# Patient Record
Sex: FEMALE | Race: WHITE | NOT HISPANIC OR LATINO | URBAN - METROPOLITAN AREA
[De-identification: names, ages, dates, MRNs, and addresses within clinical notes are randomized per-mention and may not be internally consistent; named-entity substitution may affect disease eponyms.]

---

## 2017-01-31 ENCOUNTER — OUTPATIENT (OUTPATIENT)
Dept: OUTPATIENT SERVICES | Facility: HOSPITAL | Age: 62
LOS: 1 days | End: 2017-01-31

## 2018-01-16 ENCOUNTER — OUTPATIENT (OUTPATIENT)
Dept: OUTPATIENT SERVICES | Facility: HOSPITAL | Age: 63
LOS: 1 days | End: 2018-01-16

## 2018-08-17 ENCOUNTER — OUTPATIENT (OUTPATIENT)
Dept: OUTPATIENT SERVICES | Facility: HOSPITAL | Age: 63
LOS: 1 days | End: 2018-08-17

## 2019-01-22 ENCOUNTER — APPOINTMENT (OUTPATIENT)
Dept: FAMILY MEDICINE | Facility: CLINIC | Age: 64
End: 2019-01-22

## 2019-02-15 ENCOUNTER — APPOINTMENT (OUTPATIENT)
Dept: FAMILY MEDICINE | Facility: CLINIC | Age: 64
End: 2019-02-15

## 2019-02-19 ENCOUNTER — APPOINTMENT (OUTPATIENT)
Dept: FAMILY MEDICINE | Facility: CLINIC | Age: 64
End: 2019-02-19
Payer: MEDICARE

## 2019-02-19 ENCOUNTER — NON-APPOINTMENT (OUTPATIENT)
Age: 64
End: 2019-02-19

## 2019-02-19 VITALS
DIASTOLIC BLOOD PRESSURE: 98 MMHG | HEART RATE: 75 BPM | RESPIRATION RATE: 14 BRPM | SYSTOLIC BLOOD PRESSURE: 160 MMHG | BODY MASS INDEX: 27.68 KG/M2 | WEIGHT: 141 LBS | OXYGEN SATURATION: 98 % | TEMPERATURE: 97.6 F | HEIGHT: 60 IN

## 2019-02-19 VITALS — DIASTOLIC BLOOD PRESSURE: 84 MMHG | HEART RATE: 72 BPM | SYSTOLIC BLOOD PRESSURE: 152 MMHG

## 2019-02-19 DIAGNOSIS — G47.00 INSOMNIA, UNSPECIFIED: ICD-10-CM

## 2019-02-19 DIAGNOSIS — Z78.9 OTHER SPECIFIED HEALTH STATUS: ICD-10-CM

## 2019-02-19 DIAGNOSIS — Z82.49 FAMILY HISTORY OF ISCHEMIC HEART DISEASE AND OTHER DISEASES OF THE CIRCULATORY SYSTEM: ICD-10-CM

## 2019-02-19 DIAGNOSIS — Z87.891 PERSONAL HISTORY OF NICOTINE DEPENDENCE: ICD-10-CM

## 2019-02-19 PROCEDURE — 86580 TB INTRADERMAL TEST: CPT

## 2019-02-19 PROCEDURE — 93000 ELECTROCARDIOGRAM COMPLETE: CPT

## 2019-02-19 PROCEDURE — G0439: CPT

## 2019-02-19 NOTE — PHYSICAL EXAM
[No Acute Distress] : no acute distress [Normal Sclera/Conjunctiva] : normal sclera/conjunctiva [PERRL] : pupils equal round and reactive to light [Normal TMs] : both tympanic membranes were normal [Supple] : supple [Thyroid Normal, No Nodules] : the thyroid was normal and there were no nodules present [No Respiratory Distress] : no respiratory distress  [Clear to Auscultation] : lungs were clear to auscultation bilaterally [Normal Rate] : normal rate  [Regular Rhythm] : with a regular rhythm [Pedal Pulses Present] : the pedal pulses are present [No Edema] : there was no peripheral edema [Soft] : abdomen soft [Non Tender] : non-tender [No Masses] : no abdominal mass palpated [No CVA Tenderness] : no CVA  tenderness [No Spinal Tenderness] : no spinal tenderness [No Joint Swelling] : no joint swelling [No Rash] : no rash [Normal Gait] : normal gait [No Focal Deficits] : no focal deficits [Speech Grossly Normal] : speech grossly normal [Alert and Oriented x3] : oriented to person, place, and time [de-identified] : a bit tearful  however recovers  [de-identified] : OMM   NO TEETH  [de-identified] : warm and dry

## 2019-02-19 NOTE — HEALTH RISK ASSESSMENT
[No falls in past year] : Patient reported no falls in the past year [1] : 1) Little interest or pleasure doing things for several days (1) [0] : 2) Feeling down, depressed, or hopeless: Not at all (0) [Good] : ~his/her~ current health as good [Fair] :  ~his/her~ mood as fair [Change in mental status noted] : Change in mental status noted [None] : None [Alone] : lives alone [Unemployed] : unemployed [High School] : high school [] :  [# Of Children ___] : has [unfilled] children [Feels Safe at Home] : Feels safe at home [Fully functional (bathing, dressing, toileting, transferring, walking, feeding)] : Fully functional (bathing, dressing, toileting, transferring, walking, feeding) [Fully functional (using the telephone, shopping, preparing meals, housekeeping, doing laundry, using] : Fully functional and needs no help or supervision to perform IADLs (using the telephone, shopping, preparing meals, housekeeping, doing laundry, using transportation, managing medications and managing finances) [Reports changes in dental health] : Reports changes in dental health [Smoke Detector] : smoke detector [Seat Belt] :  uses seat belt [] : No [de-identified] : DENIES  [Guns at Home] : no guns at home [Travel to Developing Areas] : does not  travel to developing areas [TB Exposure] : is not being exposed to tuberculosis [MammogramDate] : 2018  [MammogramComments] : Has FU with surgeon today  [ColonoscopyDate] : 2012  [ColonoscopyComments] : Dr. KIDD  2/26/19 appt.  [de-identified] : grieving loss of mother  [FreeTextEntry2] : HHA "sometimes" for [employer]  [de-identified] : needs dentures

## 2019-02-19 NOTE — COUNSELING
[Healthy eating counseling provided] : healthy eating [Behavioral health counseling provided] : behavioral health  [Sleep ___ hours/day] : Sleep [unfilled] hours/day [Engage in a relaxing activity] : Engage in a relaxing activity [de-identified] : Strategies reviewed for sleep hygiene and regular sleep habits.   Was not taking Trazodone . Relies on Xanax "to calm me down".  \par Normal grieving at this time. \par \par Advised Mediterranean diet

## 2019-02-19 NOTE — ASSESSMENT
[FreeTextEntry1] :  Well exam for 63  year old WF with PMH as stated in HPI / active list. \par \par Management : \par \par See HPI and Plan\par \par Labs in office today.   Will advise. \par \par Best wishes offered !\par

## 2019-02-19 NOTE — REVIEW OF SYSTEMS
[Insomnia] : insomnia [Anxiety] : anxiety [Negative] : Gastrointestinal [Fever] : no fever [Chills] : no chills [Night Sweats] : no night sweats [Discharge] : no discharge [Vision Problems] : no vision problems [Itching] : no itching [Hearing Loss] : no hearing loss [Nasal Discharge] : no nasal discharge [Sore Throat] : no sore throat [Chest Pain] : no chest pain [Palpitations] : no palpitations [Headache] : no headache [Dizziness] : no dizziness [Confusion] : no confusion [FreeTextEntry9] : no complaints today  [de-identified] : hx of vertigo

## 2019-02-19 NOTE — HISTORY OF PRESENT ILLNESS
[FreeTextEntry1] : Pt is here for a physical\par [facility]  [de-identified] : Ms. TAMARA ARAMBULA presents today to establish care being referred to me by self. Known to me from [address] \par Her mother, Jacy Stinson, recently passed. \par \par She is an affable 63 year old female with PMH significant for cervical and L-S PAIN \par \par DCIS ( 2016) Follows with MD's in [place], Dr. Jimenez; Follows with Oncology \par \par PSH significant for  breast biopsy RIGHT \par \par Denies any recent ER visits/hospitalizations/ MVA's or MSK injuries. \par \par Social:  ; no children;  has siblings she is connected with \par               works intermittently for  [employer] as [employer] \par \par

## 2019-02-22 ENCOUNTER — RESULT CHARGE (OUTPATIENT)
Age: 64
End: 2019-02-22

## 2019-02-22 ENCOUNTER — APPOINTMENT (OUTPATIENT)
Dept: FAMILY MEDICINE | Facility: CLINIC | Age: 64
End: 2019-02-22

## 2019-02-22 DIAGNOSIS — R31.29 OTHER MICROSCOPIC HEMATURIA: ICD-10-CM

## 2019-02-25 LAB — URINE CYTOLOGY: NORMAL

## 2019-03-05 LAB
ALBUMIN SERPL ELPH-MCNC: 4.6 G/DL
ALP BLD-CCNC: 83 U/L
ALT SERPL-CCNC: 13 U/L
ANION GAP SERPL CALC-SCNC: 14 MMOL/L
AST SERPL-CCNC: 18 U/L
BASOPHILS # BLD AUTO: 0.03 K/UL
BASOPHILS NFR BLD AUTO: 0.5 %
BILIRUB SERPL-MCNC: 0.5 MG/DL
BILIRUB UR QL STRIP: NORMAL
BUN SERPL-MCNC: 23 MG/DL
CALCIUM SERPL-MCNC: 10 MG/DL
CHLORIDE SERPL-SCNC: 105 MMOL/L
CHOLEST SERPL-MCNC: 288 MG/DL
CHOLEST/HDLC SERPL: 4.2 RATIO
CLARITY UR: CLEAR
CO2 SERPL-SCNC: 24 MMOL/L
COLLECTION METHOD: NORMAL
CREAT SERPL-MCNC: 0.78 MG/DL
EOSINOPHIL # BLD AUTO: 0.12 K/UL
EOSINOPHIL NFR BLD AUTO: 1.9 %
GLUCOSE SERPL-MCNC: 111 MG/DL
GLUCOSE UR-MCNC: NORMAL
HCG UR QL: 0.2 EU/DL
HCT VFR BLD CALC: 37.8 %
HDLC SERPL-MCNC: 68 MG/DL
HGB BLD-MCNC: 12.5 G/DL
HGB UR QL STRIP.AUTO: NORMAL
IMM GRANULOCYTES NFR BLD AUTO: 0.2 %
KETONES UR-MCNC: NORMAL
LDLC SERPL CALC-MCNC: 191 MG/DL
LEUKOCYTE ESTERASE UR QL STRIP: NORMAL
LYMPHOCYTES # BLD AUTO: 2.32 K/UL
LYMPHOCYTES NFR BLD AUTO: 36.2 %
MAN DIFF?: NORMAL
MCHC RBC-ENTMCNC: 30.9 PG
MCHC RBC-ENTMCNC: 33.1 GM/DL
MCV RBC AUTO: 93.3 FL
MONOCYTES # BLD AUTO: 0.68 K/UL
MONOCYTES NFR BLD AUTO: 10.6 %
NEUTROPHILS # BLD AUTO: 3.25 K/UL
NEUTROPHILS NFR BLD AUTO: 50.6 %
NITRITE UR QL STRIP: NORMAL
PH UR STRIP: 5.5
PLATELET # BLD AUTO: 179 K/UL
POTASSIUM SERPL-SCNC: 4.2 MMOL/L
PROT SERPL-MCNC: 7.6 G/DL
PROT UR STRIP-MCNC: NORMAL
RBC # BLD: 4.05 M/UL
RBC # FLD: 12.8 %
SODIUM SERPL-SCNC: 143 MMOL/L
SP GR UR STRIP: 1.02
TRIGL SERPL-MCNC: 145 MG/DL
TSH SERPL-ACNC: 3.2 UIU/ML
WBC # FLD AUTO: 6.41 K/UL

## 2019-03-29 ENCOUNTER — MED ADMIN CHARGE (OUTPATIENT)
Age: 64
End: 2019-03-29

## 2019-04-02 ENCOUNTER — APPOINTMENT (OUTPATIENT)
Dept: FAMILY MEDICINE | Facility: CLINIC | Age: 64
End: 2019-04-02
Payer: MEDICARE

## 2019-04-02 VITALS
DIASTOLIC BLOOD PRESSURE: 100 MMHG | BODY MASS INDEX: 27.48 KG/M2 | WEIGHT: 140 LBS | RESPIRATION RATE: 14 BRPM | OXYGEN SATURATION: 98 % | SYSTOLIC BLOOD PRESSURE: 154 MMHG | HEART RATE: 113 BPM | TEMPERATURE: 98 F | HEIGHT: 60 IN

## 2019-04-02 VITALS — DIASTOLIC BLOOD PRESSURE: 102 MMHG | SYSTOLIC BLOOD PRESSURE: 160 MMHG

## 2019-04-02 PROCEDURE — 99214 OFFICE O/P EST MOD 30 MIN: CPT

## 2019-04-02 NOTE — REVIEW OF SYSTEMS
[Negative] : Musculoskeletal [Headache] : no headache [Confusion] : no confusion [Memory Loss] : no memory loss

## 2019-04-02 NOTE — HISTORY OF PRESENT ILLNESS
[FreeTextEntry8] : Pt is here for feet ache, headache, nausea no vomiting\par [facility] \par \par Patient endorses that 5 days ago she woke and felt general malaise and body aches and chilled. Poor appetite . \par Colonoscopy was scheduled for next day and she took first bottle of prep that evening,\par Woke with nausea and vomited and then fainted, briefly.\par Canceled  test.\par Slowly improved over the next few days  drinking liquids and bland diet. \par

## 2019-04-02 NOTE — ASSESSMENT
[FreeTextEntry1] : \par Differentials of gastroenteritis vs  SE from prep with one episode of fainting now resolving\par \par HTN  advised to resume BP medications today. \par \par CRC screen with FIT.

## 2019-04-02 NOTE — PHYSICAL EXAM
[No Acute Distress] : no acute distress [Well-Appearing] : well-appearing [Normal Sclera/Conjunctiva] : normal sclera/conjunctiva [PERRL] : pupils equal round and reactive to light [EOMI] : extraocular movements intact [Normal Oropharynx] : the oropharynx was normal [Supple] : supple [No Lymphadenopathy] : no lymphadenopathy [No Respiratory Distress] : no respiratory distress  [Clear to Auscultation] : lungs were clear to auscultation bilaterally [Normal Rate] : normal rate  [Regular Rhythm] : with a regular rhythm [No Edema] : there was no peripheral edema [Soft] : abdomen soft [Non Tender] : non-tender [Non-distended] : non-distended [No Spinal Tenderness] : no spinal tenderness [No Joint Swelling] : no joint swelling [No Rash] : no rash [Normal Gait] : normal gait [No Focal Deficits] : no focal deficits [Alert and Oriented x3] : oriented to person, place, and time [de-identified] : warm and dry

## 2019-05-14 ENCOUNTER — APPOINTMENT (OUTPATIENT)
Dept: FAMILY MEDICINE | Facility: CLINIC | Age: 64
End: 2019-05-14

## 2019-10-22 ENCOUNTER — APPOINTMENT (OUTPATIENT)
Dept: FAMILY MEDICINE | Facility: CLINIC | Age: 64
End: 2019-10-22
Payer: MEDICARE

## 2019-10-22 VITALS
HEIGHT: 60 IN | SYSTOLIC BLOOD PRESSURE: 164 MMHG | HEART RATE: 73 BPM | DIASTOLIC BLOOD PRESSURE: 100 MMHG | WEIGHT: 136 LBS | BODY MASS INDEX: 26.7 KG/M2 | RESPIRATION RATE: 16 BRPM | TEMPERATURE: 98.1 F | OXYGEN SATURATION: 98 %

## 2019-10-22 VITALS — DIASTOLIC BLOOD PRESSURE: 90 MMHG | SYSTOLIC BLOOD PRESSURE: 160 MMHG

## 2019-10-22 DIAGNOSIS — Z23 ENCOUNTER FOR IMMUNIZATION: ICD-10-CM

## 2019-10-22 PROCEDURE — G0008: CPT

## 2019-10-22 PROCEDURE — 99214 OFFICE O/P EST MOD 30 MIN: CPT | Mod: 25

## 2019-10-22 PROCEDURE — 90686 IIV4 VACC NO PRSV 0.5 ML IM: CPT

## 2019-10-22 NOTE — ASSESSMENT
[FreeTextEntry1] : HTN  in addition she comments that she is eating out a lot more in the recent months as she is having work done.  The home.  She is aware that insult is not advised in the setting of hypertension.\par we will introduce amlodipine.  She is advised amlodipine in the morning and metoprolol in the evening. Followup in one month.  Advised however to take a dose today after picking up prescription\par \par

## 2019-10-22 NOTE — HISTORY OF PRESENT ILLNESS
[FreeTextEntry1] : pt states there was a recall on blood pressure medication \par She was taking Losartan however elected to stop it one month ago, despite the pharmacist reassuring her.  She did not make a call to the office.\par \par \[facility] [place]

## 2019-10-22 NOTE — PHYSICAL EXAM
[Normal Sclera/Conjunctiva] : normal sclera/conjunctiva [No JVD] : no jugular venous distention [No Respiratory Distress] : no respiratory distress  [Clear to Auscultation] : lungs were clear to auscultation bilaterally [Regular Rhythm] : with a regular rhythm [Normal S1, S2] : normal S1 and S2 [Soft] : abdomen soft [Non Tender] : non-tender [No Focal Deficits] : no focal deficits [Alert and Oriented x3] : oriented to person, place, and time [Normal Insight/Judgement] : insight and judgment were intact [de-identified] : JAMEEL

## 2019-12-27 ENCOUNTER — RX RENEWAL (OUTPATIENT)
Age: 64
End: 2019-12-27

## 2020-02-14 ENCOUNTER — APPOINTMENT (OUTPATIENT)
Dept: FAMILY MEDICINE | Facility: CLINIC | Age: 65
End: 2020-02-14

## 2020-02-18 ENCOUNTER — APPOINTMENT (OUTPATIENT)
Dept: FAMILY MEDICINE | Facility: CLINIC | Age: 65
End: 2020-02-18
Payer: MEDICARE

## 2020-02-18 VITALS
TEMPERATURE: 98 F | SYSTOLIC BLOOD PRESSURE: 182 MMHG | BODY MASS INDEX: 26.31 KG/M2 | WEIGHT: 134 LBS | HEIGHT: 60 IN | RESPIRATION RATE: 16 BRPM | HEART RATE: 94 BPM | DIASTOLIC BLOOD PRESSURE: 100 MMHG | OXYGEN SATURATION: 99 %

## 2020-02-18 PROCEDURE — 99214 OFFICE O/P EST MOD 30 MIN: CPT

## 2020-02-20 NOTE — ASSESSMENT
[FreeTextEntry1] : Endorses eating out more due to "no kitchen" and canned soup "sometimes"\par \par Advised of effect salt may influence BP and she will monitor and bring home monitor to office next visit. \par \par Reassured re: skin lesion and advised topical for candidiasis. \par \par Has plan to Fu with GI for colonoscopy.

## 2020-02-20 NOTE — HISTORY OF PRESENT ILLNESS
[FreeTextEntry1] : wants to have a jesika looked at on right side of stomach \par discuss colonoscopy \par FU on HTN [de-identified] : Last seen in FU in October and encounter reviewed.\par She resumed Losartan ( 100 mgs )  and is taking Amlodipine ( 5 mgs)  and Metoprolol ( 50 mgs) daily.\par Has a home blood pressure monitor, however, did not present with her today.\par Reportedly, the blood pressures are 140 over 80.\par \par Is concerned  for a "growth" on right side of abdomen first noted last week. \par \par Blood on tissue after BM and is due for a colonoscopy. Denies constipation.\par \par Heightened stressors persist with renovation of her home.

## 2020-05-14 ENCOUNTER — RX RENEWAL (OUTPATIENT)
Age: 65
End: 2020-05-14

## 2020-12-01 ENCOUNTER — LABORATORY RESULT (OUTPATIENT)
Age: 65
End: 2020-12-01

## 2020-12-01 ENCOUNTER — APPOINTMENT (OUTPATIENT)
Dept: FAMILY MEDICINE | Facility: CLINIC | Age: 65
End: 2020-12-01
Payer: MEDICARE

## 2020-12-01 ENCOUNTER — NON-APPOINTMENT (OUTPATIENT)
Age: 65
End: 2020-12-01

## 2020-12-01 VITALS
RESPIRATION RATE: 16 BRPM | OXYGEN SATURATION: 98 % | WEIGHT: 146 LBS | BODY MASS INDEX: 28.66 KG/M2 | HEART RATE: 79 BPM | TEMPERATURE: 96 F | HEIGHT: 60 IN

## 2020-12-01 DIAGNOSIS — E55.9 VITAMIN D DEFICIENCY, UNSPECIFIED: ICD-10-CM

## 2020-12-01 PROCEDURE — G0439: CPT

## 2020-12-01 PROCEDURE — G0008: CPT

## 2020-12-01 PROCEDURE — 99214 OFFICE O/P EST MOD 30 MIN: CPT | Mod: 25

## 2020-12-01 PROCEDURE — 81003 URINALYSIS AUTO W/O SCOPE: CPT | Mod: QW

## 2020-12-01 PROCEDURE — 90662 IIV NO PRSV INCREASED AG IM: CPT

## 2020-12-01 PROCEDURE — G0438: CPT

## 2020-12-01 PROCEDURE — 93000 ELECTROCARDIOGRAM COMPLETE: CPT

## 2020-12-01 RX ORDER — NYSTATIN 100000 [USP'U]/G
100000 CREAM TOPICAL TWICE DAILY
Qty: 1 | Refills: 3 | Status: DISCONTINUED | COMMUNITY
Start: 2020-02-18 | End: 2020-12-01

## 2020-12-01 RX ORDER — LOSARTAN POTASSIUM 100 MG/1
100 TABLET, FILM COATED ORAL
Qty: 90 | Refills: 1 | Status: DISCONTINUED | COMMUNITY
Start: 2019-02-19 | End: 2020-12-01

## 2020-12-01 RX ORDER — METOPROLOL SUCCINATE 50 MG/1
50 TABLET, EXTENDED RELEASE ORAL
Qty: 3 | Refills: 3 | Status: DISCONTINUED | COMMUNITY
Start: 2019-02-19 | End: 2020-12-01

## 2020-12-01 RX ORDER — TRAZODONE HYDROCHLORIDE 50 MG/1
50 TABLET ORAL
Qty: 1 | Refills: 1 | Status: DISCONTINUED | COMMUNITY
Start: 2019-02-19 | End: 2020-12-01

## 2020-12-01 RX ORDER — ERGOCALCIFEROL 1.25 MG/1
1.25 MG CAPSULE, LIQUID FILLED ORAL
Refills: 1 | Status: ACTIVE | COMMUNITY
Start: 2020-12-01

## 2020-12-06 NOTE — ASSESSMENT
[FreeTextEntry1] :  Well exam for 65  year old WF with PMH as stated in HPI / active list. \par \par Management : \par \par See HPI and Plan\par \par Labs in office today.   Will advise. \par \par Best wishes offered !\par

## 2020-12-06 NOTE — COUNSELING
[Healthy eating counseling provided] : healthy eating [Behavioral health counseling provided] : behavioral health  [Sleep ___ hours/day] : Sleep [unfilled] hours/day [Engage in a relaxing activity] : Engage in a relaxing activity [de-identified] : Advised Mediterranean diet \par \par PRN Xanax helps her sleep "calm me down" \par \par She does a lot of walking as she does not drive.

## 2020-12-06 NOTE — PHYSICAL EXAM
[No Acute Distress] : no acute distress [Normal Sclera/Conjunctiva] : normal sclera/conjunctiva [PERRL] : pupils equal round and reactive to light [Normal TMs] : both tympanic membranes were normal [Supple] : supple [Thyroid Normal, No Nodules] : the thyroid was normal and there were no nodules present [No Respiratory Distress] : no respiratory distress  [Clear to Auscultation] : lungs were clear to auscultation bilaterally [Normal Rate] : normal rate  [Regular Rhythm] : with a regular rhythm [Pedal Pulses Present] : the pedal pulses are present [No Edema] : there was no peripheral edema [Soft] : abdomen soft [Non Tender] : non-tender [No Masses] : no abdominal mass palpated [No CVA Tenderness] : no CVA  tenderness [No Spinal Tenderness] : no spinal tenderness [No Joint Swelling] : no joint swelling [No Rash] : no rash [Normal Gait] : normal gait [No Focal Deficits] : no focal deficits [Speech Grossly Normal] : speech grossly normal [Alert and Oriented x3] : oriented to person, place, and time [de-identified] : a bit tearful  however recovers  [de-identified] : OMM   NO TEETH  [de-identified] : warm and dry

## 2020-12-06 NOTE — REVIEW OF SYSTEMS
[Insomnia] : insomnia [Anxiety] : anxiety [Negative] : Gastrointestinal [Fever] : no fever [Chills] : no chills [Night Sweats] : no night sweats [Discharge] : no discharge [Vision Problems] : no vision problems [Itching] : no itching [Hearing Loss] : no hearing loss [Nasal Discharge] : no nasal discharge [Sore Throat] : no sore throat [Chest Pain] : no chest pain [Palpitations] : no palpitations [Headache] : no headache [Dizziness] : no dizziness [Confusion] : no confusion [FreeTextEntry9] : no complaints today  [de-identified] : hx of vertigo

## 2020-12-06 NOTE — HEALTH RISK ASSESSMENT
[0] : 2) Feeling down, depressed, or hopeless: Not at all (0) [Good] : ~his/her~ current health as good [Fair] :  ~his/her~ mood as fair [No falls in past year] : Patient reported no falls in the past year [1] : 1) Little interest or pleasure doing things for several days (1) [Change in mental status noted] : Change in mental status noted [None] : None [Alone] : lives alone [Unemployed] : unemployed [High School] : high school [] :  [# Of Children ___] : has [unfilled] children [Feels Safe at Home] : Feels safe at home [Fully functional (bathing, dressing, toileting, transferring, walking, feeding)] : Fully functional (bathing, dressing, toileting, transferring, walking, feeding) [Fully functional (using the telephone, shopping, preparing meals, housekeeping, doing laundry, using] : Fully functional and needs no help or supervision to perform IADLs (using the telephone, shopping, preparing meals, housekeeping, doing laundry, using transportation, managing medications and managing finances) [Reports changes in dental health] : Reports changes in dental health [Smoke Detector] : smoke detector [Seat Belt] :  uses seat belt [] : No [de-identified] : DENIES  [Patient reported mammogram was normal] : Patient reported mammogram was normal [Reports changes in hearing] : Reports no changes in hearing [Reports changes in vision] : Reports no changes in vision [Guns at Home] : no guns at home [Travel to Developing Areas] : does not  travel to developing areas [TB Exposure] : is not being exposed to tuberculosis [MammogramDate] : 2018  [MammogramComments] : Follows at [facility]  [ColonoscopyDate] : 2012  [ColonoscopyComments] : Dr. KIDD  2/26/19 appt.  [de-identified] : grieving loss of mother  [FreeTextEntry2] : HHA "sometimes" for [employer]  [de-identified] : needs dentures

## 2020-12-06 NOTE — HISTORY OF PRESENT ILLNESS
[FreeTextEntry1] : Pt is here for   CPE \par Last CPE was 2/19. \par In recent months has followed with Hematology/oncology for PMH ( 2016; RIGHT  DCIS)  of Breast Cancer .  ALL STABLE.  Takes weekly D 3. \par Otherwise ha bee n well and compliant with medications.\par With regard to COVID, isolated and No suggestion of virus.\par Home renovations have been delayed.  [de-identified] : \par In review: 2019 \par Ms. TAMARA ARAMBULA presents today to establish care being referred to me by self. Known to me from [address] \par Her mother, Jacy Stinson, recently passed. \par \par She is an affable 63 year old female with PMH significant for cervical and L-S PAIN \par \par DCIS ( 2016) Follows with MD's in [place], Dr. Jimenez; Follows with Oncology \par \par PSH significant for  breast biopsy RIGHT \par \par Denies any recent ER visits/hospitalizations/ MVA's or MSK injuries. \par \par Social:  ; no children;  has siblings she is connected with \par               works intermittently for  [employer] as [employer] \par \par

## 2021-03-05 ENCOUNTER — APPOINTMENT (OUTPATIENT)
Dept: FAMILY MEDICINE | Facility: CLINIC | Age: 66
End: 2021-03-05
Payer: MEDICARE

## 2021-03-05 DIAGNOSIS — Z87.898 PERSONAL HISTORY OF OTHER SPECIFIED CONDITIONS: ICD-10-CM

## 2021-03-05 DIAGNOSIS — Z11.1 ENCOUNTER FOR SCREENING FOR RESPIRATORY TUBERCULOSIS: ICD-10-CM

## 2021-03-05 DIAGNOSIS — L91.8 OTHER HYPERTROPHIC DISORDERS OF THE SKIN: ICD-10-CM

## 2021-03-05 DIAGNOSIS — B37.2 CANDIDIASIS OF SKIN AND NAIL: ICD-10-CM

## 2021-03-05 DIAGNOSIS — Z87.39 PERSONAL HISTORY OF OTHER DISEASES OF THE MUSCULOSKELETAL SYSTEM AND CONNECTIVE TISSUE: ICD-10-CM

## 2021-03-05 PROCEDURE — 99072 ADDL SUPL MATRL&STAF TM PHE: CPT

## 2021-03-05 PROCEDURE — 99214 OFFICE O/P EST MOD 30 MIN: CPT

## 2021-03-09 PROBLEM — L91.8 SKIN TAG, ACQUIRED: Status: ACTIVE | Noted: 2021-03-09

## 2021-03-09 PROBLEM — Z11.1 SCREENING FOR TUBERCULOSIS: Status: RESOLVED | Noted: 2019-02-19 | Resolved: 2021-03-09

## 2021-03-09 PROBLEM — B37.2 CANDIDIASIS, CUTANEOUS: Status: RESOLVED | Noted: 2020-02-18 | Resolved: 2021-03-09

## 2021-03-09 NOTE — HISTORY OF PRESENT ILLNESS
[FreeTextEntry8] : Last seen for annual wellness visit in Dec. 2020 . \par \par Today Presetns for \par the pt has a " pimple" on right lower back FIRST noted a  few weeks;  no discomfort but itchy\par \par also needs her SCAT  bus form filled out \par SHe has  considerable Spondylolisthesis, knee and hip pain and endurance limited to one block \par \par

## 2021-03-09 NOTE — ASSESSMENT
[FreeTextEntry1] : Reassured Anu that the lesion noted is benign.\par \par SCAT  form will be completed

## 2021-05-24 ENCOUNTER — RX RENEWAL (OUTPATIENT)
Age: 66
End: 2021-05-24

## 2021-06-08 ENCOUNTER — RX RENEWAL (OUTPATIENT)
Age: 66
End: 2021-06-08

## 2021-08-13 ENCOUNTER — APPOINTMENT (OUTPATIENT)
Dept: FAMILY MEDICINE | Facility: CLINIC | Age: 66
End: 2021-08-13

## 2021-08-24 ENCOUNTER — APPOINTMENT (OUTPATIENT)
Dept: FAMILY MEDICINE | Facility: CLINIC | Age: 66
End: 2021-08-24
Payer: MEDICARE

## 2021-08-24 VITALS
RESPIRATION RATE: 16 BRPM | WEIGHT: 143.6 LBS | BODY MASS INDEX: 28.19 KG/M2 | SYSTOLIC BLOOD PRESSURE: 160 MMHG | HEIGHT: 60 IN | HEART RATE: 83 BPM | TEMPERATURE: 97.9 F | OXYGEN SATURATION: 97 % | DIASTOLIC BLOOD PRESSURE: 100 MMHG

## 2021-08-24 PROCEDURE — 99214 OFFICE O/P EST MOD 30 MIN: CPT

## 2021-08-24 RX ORDER — ATORVASTATIN CALCIUM 10 MG/1
10 TABLET, FILM COATED ORAL
Qty: 60 | Refills: 2 | Status: DISCONTINUED | COMMUNITY
Start: 2021-06-08 | End: 2021-08-24

## 2021-08-24 RX ORDER — GINGER ROOT/GINGER ROOT EXT 262.5 MG
CAPSULE ORAL
Refills: 0 | Status: ACTIVE | COMMUNITY

## 2021-08-31 NOTE — END OF VISIT
[FreeTextEntry3] : Medical record entries made by the scribe today were at my direction and personally dictated to them by me, Dr. Corin Lopez on 08/24/2021. I have reviewed the chart and agree that the record accurately reflects my personal performance of the history, physical exam, assessment and plan.

## 2021-08-31 NOTE — PHYSICAL EXAM
[No Acute Distress] : no acute distress [Normal Sclera/Conjunctiva] : normal sclera/conjunctiva [No JVD] : no jugular venous distention [No Respiratory Distress] : no respiratory distress  [No Accessory Muscle Use] : no accessory muscle use [Regular Rhythm] : with a regular rhythm [No Rash] : no rash [Coordination Grossly Intact] : coordination grossly intact [Alert and Oriented x3] : oriented to person, place, and time [Clear to Auscultation] : lungs were clear to auscultation bilaterally [Normal S1, S2] : normal S1 and S2 [No Carotid Bruits] : no carotid bruits [Pedal Pulses Present] : the pedal pulses are present [Soft] : abdomen soft [Non Tender] : non-tender [Normal Supraclavicular Nodes] : no supraclavicular lymphadenopathy [No Spinal Tenderness] : no spinal tenderness [Speech Grossly Normal] : speech grossly normal [Normal Insight/Judgement] : insight and judgment were intact [de-identified] : engaging  [de-identified] : JAMEEL TOUSSAINT  AT

## 2021-08-31 NOTE — PLAN
[FreeTextEntry1] : Well exam for 65 year y/o F with PMH as noted in HPI/active list.\par \par Management:\par \par See HPI and PLAN\par \par HTN !   Much discussion re affects of diet on BP and she will "try" to cut down on salt.\par Advised Amlodipine now BID  5 mgs in Am and 5 mgs in PM.\par \par Referral to cardiology\par \par Fasting labs for cholesterol to be done in office. Will advise.\par \par Best wishes offered !

## 2021-08-31 NOTE — REVIEW OF SYSTEMS
[Negative] : Musculoskeletal [Anxiety] : anxiety [Shortness Of Breath] : no shortness of breath [Cough] : no cough [Headache] : no headache [Confusion] : no confusion [Memory Loss] : no memory loss

## 2021-08-31 NOTE — ADDENDUM
[FreeTextEntry1] : I, Lamar Enamorado acting as a scribe for Dr. Corin Ardon MD on 08/24/2021 at 3:29 PM.\par

## 2021-08-31 NOTE — HISTORY OF PRESENT ILLNESS
[FreeTextEntry1] : FUV after syncope episode yesterday. Requesting referral to cardio. BP check.\par Shellfish allergy\par [facility]\par Last seen in office 3/5/2021 and encounter reviewed. [de-identified] : Anu is a 66 y/o F who presents to the office after syncope episode yesterday.\par She woke up on the floor last night after dinner. States she has IBS and her dinner did not agree with her. She was out for a few "SECONDS".  \par She was in the bathroom during the episode. Felt nauseous and eased herself to the ground. She defecated on the floor of the bathroom as she could not make it to the toilet.\par When queried she endorses she has had similar events in the past. \par \par Pt is requesting referrals to cardiologist. \par \par States she has been eating a lot of salty foods. CHIPS. Canned foods and prepared frozen foods. \par \par For her blood pressure, she has been taking amlodipine daily.\par \par Also anticipating Dental work , dentures or implants. \par \par In review 3/5/2021:\par Last seen for annual wellness visit in Dec. 2020 . \par \par Today Presetns for \par the pt has a " pimple" on right lower back FIRST noted a few weeks; no discomfort but itchy\par \par also needs her SCAT bus form filled out \par SHe has considerable Spondylolisthesis, knee and hip pain and endurance limited to one block

## 2021-09-07 ENCOUNTER — APPOINTMENT (OUTPATIENT)
Dept: FAMILY MEDICINE | Facility: CLINIC | Age: 66
End: 2021-09-07
Payer: MEDICARE

## 2021-09-07 VITALS — DIASTOLIC BLOOD PRESSURE: 90 MMHG | SYSTOLIC BLOOD PRESSURE: 160 MMHG

## 2021-09-07 DIAGNOSIS — K21.9 GASTRO-ESOPHAGEAL REFLUX DISEASE W/OUT ESOPHAGITIS: ICD-10-CM

## 2021-09-07 PROCEDURE — 99214 OFFICE O/P EST MOD 30 MIN: CPT

## 2021-09-07 RX ORDER — OMEPRAZOLE 20 MG/1
20 CAPSULE, DELAYED RELEASE ORAL DAILY
Qty: 1 | Refills: 1 | Status: ACTIVE | COMMUNITY
Start: 2021-09-07 | End: 1900-01-01

## 2021-09-07 NOTE — REVIEW OF SYSTEMS
[Anxiety] : anxiety [Negative] : Musculoskeletal [Shortness Of Breath] : no shortness of breath [Cough] : no cough [Headache] : no headache [Confusion] : no confusion [Memory Loss] : no memory loss

## 2021-09-07 NOTE — PLAN
[FreeTextEntry1] : HTN  still not at goal  continue current management and FU in 2 weeks. \par \par PMH DCIS  screening MRI ordered. \par \par Best wishes offered. \par \par In review: August 24: \par Well exam for 65 year y/o F with PMH as noted in HPI/active list.\par \par Management:\par \par See HPI and PLAN\par \par HTN !   Much discussion re affects of diet on BP and she will "try" to cut down on salt.\par Advised Amlodipine now BID  5 mgs in Am and 5 mgs in PM.\par \par Referral to cardiology\par \par Fasting labs for cholesterol to be done in office. Will advise.\par \par Best wishes offered !

## 2021-09-07 NOTE — HISTORY OF PRESENT ILLNESS
[FreeTextEntry1] : FUV on HTN  \par \par MRI of breast Hx of breast cancer S/p lumpectomy 2016 \par Due to insurance issues her surgeon is not able to prescribe MRI\par SPoke with OM at office of Dr. Rai;  Requested ON /Consult and will prescribe. \par Last seen in office 3/5/2021 and encounter reviewed. [de-identified] : Last seen in august:  BP was elevated. Amlodipine increased to BID \par Endorses compliance \par Has appt. with cardiology next month for ? syncope events. \par No new events. \par Endorses she has cut back on salt! \par \par \par In review: August 2020 \par Anu is a 66 y/o F who presents to the office after syncope episode yesterday.\par She woke up on the floor last night after dinner. States she has IBS and her dinner did not agree with her. She was out for a few "SECONDS".  \par She was in the bathroom during the episode. Felt nauseous and eased herself to the ground. She defecated on the floor of the bathroom as she could not make it to the toilet.\par When queried she endorses she has had similar events in the past. \par \par Pt is requesting referrals to cardiologist. \par \par States she has been eating a lot of salty foods. CHIPS. Canned foods and prepared frozen foods. \par \par For her blood pressure, she has been taking amlodipine daily.\par \par Also anticipating Dental work , dentures or implants. \par \par In review 3/5/2021:\par Last seen for annual wellness visit in Dec. 2020 . \par \par Today Presetns for \par the pt has a " pimple" on right lower back FIRST noted a few weeks; no discomfort but itchy\par \par also needs her SCAT bus form filled out \par SHe has considerable Spondylolisthesis, knee and hip pain and endurance limited to one block

## 2021-09-07 NOTE — PHYSICAL EXAM
[No Acute Distress] : no acute distress [Normal Sclera/Conjunctiva] : normal sclera/conjunctiva [No JVD] : no jugular venous distention [No Respiratory Distress] : no respiratory distress  [No Accessory Muscle Use] : no accessory muscle use [Clear to Auscultation] : lungs were clear to auscultation bilaterally [Regular Rhythm] : with a regular rhythm [Normal S1, S2] : normal S1 and S2 [No Carotid Bruits] : no carotid bruits [Pedal Pulses Present] : the pedal pulses are present [Soft] : abdomen soft [Non Tender] : non-tender [Normal Supraclavicular Nodes] : no supraclavicular lymphadenopathy [No Spinal Tenderness] : no spinal tenderness [No Rash] : no rash [Coordination Grossly Intact] : coordination grossly intact [Speech Grossly Normal] : speech grossly normal [Alert and Oriented x3] : oriented to person, place, and time [Normal Insight/Judgement] : insight and judgment were intact [de-identified] : engaging  [de-identified] : JAMEEL

## 2021-10-05 ENCOUNTER — APPOINTMENT (OUTPATIENT)
Dept: FAMILY MEDICINE | Facility: CLINIC | Age: 66
End: 2021-10-05

## 2021-10-17 ENCOUNTER — RX RENEWAL (OUTPATIENT)
Age: 66
End: 2021-10-17

## 2021-10-29 ENCOUNTER — APPOINTMENT (OUTPATIENT)
Dept: FAMILY MEDICINE | Facility: CLINIC | Age: 66
End: 2021-10-29
Payer: MEDICARE

## 2021-10-29 VITALS
OXYGEN SATURATION: 96 % | HEART RATE: 80 BPM | RESPIRATION RATE: 16 BRPM | BODY MASS INDEX: 27.48 KG/M2 | HEIGHT: 60 IN | WEIGHT: 140 LBS | TEMPERATURE: 98.3 F

## 2021-10-29 DIAGNOSIS — R55 SYNCOPE AND COLLAPSE: ICD-10-CM

## 2021-10-29 PROCEDURE — 90662 IIV NO PRSV INCREASED AG IM: CPT

## 2021-10-29 PROCEDURE — 90670 PCV13 VACCINE IM: CPT

## 2021-10-29 PROCEDURE — 99214 OFFICE O/P EST MOD 30 MIN: CPT | Mod: 25

## 2021-10-29 PROCEDURE — G0009: CPT

## 2021-10-29 PROCEDURE — G0008: CPT

## 2021-11-04 NOTE — PLAN
[FreeTextEntry1] : HTN  at goal   will continue amlodipine 5 mgs daily \par Again advise d to avoid fast/processed foods. !\par \par Syncope  again referral  to cardiology \par \par Labs in office today. \par \par Discussed with patient concerns for her housing, "keeping up with appointments and "travel hard" ( doesn't drive and depends on public transportation;\par \par MRI breasts will request report. \par \par Best wishes offered!\par \par \par Time Based Billing:   I have spent 30   minutes of time for this encounter.\par  Greater than 50 % of the face to face encounter time was spent on review of chart and consultations as well as  medication reconciliation,  counseling and/or coordination of care.                                                    .\par

## 2021-11-04 NOTE — HISTORY OF PRESENT ILLNESS
[FreeTextEntry1] : FUV on HTN \par Last seen for same in Sept. 2021: Reviewed. \par Breast MRI she endorses [facility]. \par \par Needs Cardiology to evaluate syncope and  agrees to go and referral  provided.  [de-identified] : In review Sept. 2021 \par \par FUV on HTN  \par \par MRI of breast Hx of breast cancer S/p lumpectomy 2016 \par Due to insurance issues her surgeon is not able to prescribe MRI\par SPoke with OM at office of Dr. Rai;  Requested ON /Consult and will prescribe. \par  \par Last seen in august:  BP was elevated. Amlodipine increased to BID \par Endorses compliance \par Has appt. with cardiology next month for ? syncope events. \par No new events. \par Endorses she has cut back on salt! \par \par \par In review: August 2020 \par Anu is a 64 y/o F who presents to the office after syncope episode yesterday.\par She woke up on the floor last night after dinner. States she has IBS and her dinner did not agree with her. She was out for a few "SECONDS".  \par She was in the bathroom during the episode. Felt nauseous and eased herself to the ground. She defecated on the floor of the bathroom as she could not make it to the toilet.\par When queried she endorses she has had similar events in the past. \par \par Pt is requesting referrals to cardiologist. \par \par States she has been eating a lot of salty foods. CHIPS. Canned foods and prepared frozen foods. \par \par For her blood pressure, she has been taking amlodipine daily.\par \par Also anticipating Dental work , dentures or implants. \par \par In review 3/5/2021:\par Last seen for annual wellness visit in Dec. 2020 . \par \par Today Presetns for \par the pt has a " pimple" on right lower back FIRST noted a few weeks; no discomfort but itchy\par \par also needs her SCAT bus form filled out \par SHe has considerable Spondylolisthesis, knee and hip pain and endurance limited to one block

## 2021-11-04 NOTE — PHYSICAL EXAM
[No Acute Distress] : no acute distress [Normal Sclera/Conjunctiva] : normal sclera/conjunctiva [No JVD] : no jugular venous distention [No Respiratory Distress] : no respiratory distress  [No Accessory Muscle Use] : no accessory muscle use [Clear to Auscultation] : lungs were clear to auscultation bilaterally [Regular Rhythm] : with a regular rhythm [Normal S1, S2] : normal S1 and S2 [No Carotid Bruits] : no carotid bruits [Pedal Pulses Present] : the pedal pulses are present [Soft] : abdomen soft [Non Tender] : non-tender [Normal Supraclavicular Nodes] : no supraclavicular lymphadenopathy [No Spinal Tenderness] : no spinal tenderness [No Rash] : no rash [Coordination Grossly Intact] : coordination grossly intact [Speech Grossly Normal] : speech grossly normal [Alert and Oriented x3] : oriented to person, place, and time [Normal Insight/Judgement] : insight and judgment were intact [de-identified] : engaging  [de-identified] : JAMEEL

## 2021-11-08 LAB
ALBUMIN SERPL ELPH-MCNC: 4.6 G/DL
ALP BLD-CCNC: 118 U/L
ALT SERPL-CCNC: 14 U/L
ANION GAP SERPL CALC-SCNC: 15 MMOL/L
AST SERPL-CCNC: 19 U/L
BASOPHILS # BLD AUTO: 0.03 K/UL
BASOPHILS NFR BLD AUTO: 0.5 %
BILIRUB SERPL-MCNC: 1 MG/DL
BUN SERPL-MCNC: 17 MG/DL
CALCIUM SERPL-MCNC: 9.8 MG/DL
CHLORIDE SERPL-SCNC: 102 MMOL/L
CHOLEST SERPL-MCNC: 286 MG/DL
CO2 SERPL-SCNC: 24 MMOL/L
CREAT SERPL-MCNC: 0.78 MG/DL
EOSINOPHIL # BLD AUTO: 0.11 K/UL
EOSINOPHIL NFR BLD AUTO: 1.7 %
FOLATE SERPL-MCNC: 18.3 NG/ML
GLUCOSE SERPL-MCNC: 94 MG/DL
HCT VFR BLD CALC: 43.8 %
HDLC SERPL-MCNC: 66 MG/DL
HGB BLD-MCNC: 14.5 G/DL
IMM GRANULOCYTES NFR BLD AUTO: 0.3 %
LDLC SERPL CALC-MCNC: 193 MG/DL
LYMPHOCYTES # BLD AUTO: 1.62 K/UL
LYMPHOCYTES NFR BLD AUTO: 24.7 %
MAN DIFF?: NORMAL
MCHC RBC-ENTMCNC: 30.8 PG
MCHC RBC-ENTMCNC: 33.1 GM/DL
MCV RBC AUTO: 93 FL
MONOCYTES # BLD AUTO: 0.52 K/UL
MONOCYTES NFR BLD AUTO: 7.9 %
NEUTROPHILS # BLD AUTO: 4.26 K/UL
NEUTROPHILS NFR BLD AUTO: 64.9 %
NONHDLC SERPL-MCNC: 220 MG/DL
PLATELET # BLD AUTO: 204 K/UL
POTASSIUM SERPL-SCNC: 4.2 MMOL/L
PROT SERPL-MCNC: 7.3 G/DL
RBC # BLD: 4.71 M/UL
RBC # FLD: 13.2 %
SODIUM SERPL-SCNC: 141 MMOL/L
TRIGL SERPL-MCNC: 133 MG/DL
VIT B12 SERPL-MCNC: 449 PG/ML
WBC # FLD AUTO: 6.56 K/UL

## 2021-12-05 ENCOUNTER — RX RENEWAL (OUTPATIENT)
Age: 66
End: 2021-12-05

## 2022-01-07 ENCOUNTER — APPOINTMENT (OUTPATIENT)
Dept: FAMILY MEDICINE | Facility: CLINIC | Age: 67
End: 2022-01-07

## 2022-01-14 ENCOUNTER — RX RENEWAL (OUTPATIENT)
Age: 67
End: 2022-01-14

## 2022-03-07 ENCOUNTER — RX RENEWAL (OUTPATIENT)
Age: 67
End: 2022-03-07

## 2022-04-05 ENCOUNTER — NON-APPOINTMENT (OUTPATIENT)
Age: 67
End: 2022-04-05

## 2022-04-05 ENCOUNTER — APPOINTMENT (OUTPATIENT)
Dept: FAMILY MEDICINE | Facility: CLINIC | Age: 67
End: 2022-04-05
Payer: MEDICARE

## 2022-04-05 VITALS — DIASTOLIC BLOOD PRESSURE: 84 MMHG | SYSTOLIC BLOOD PRESSURE: 148 MMHG

## 2022-04-05 VITALS
HEART RATE: 78 BPM | OXYGEN SATURATION: 99 % | WEIGHT: 142 LBS | RESPIRATION RATE: 16 BRPM | HEIGHT: 60 IN | BODY MASS INDEX: 27.88 KG/M2 | TEMPERATURE: 97.9 F

## 2022-04-05 PROCEDURE — G0439: CPT

## 2022-04-05 PROCEDURE — G0444 DEPRESSION SCREEN ANNUAL: CPT | Mod: 59

## 2022-04-05 PROCEDURE — 99213 OFFICE O/P EST LOW 20 MIN: CPT | Mod: 25

## 2022-04-05 PROCEDURE — 93000 ELECTROCARDIOGRAM COMPLETE: CPT | Mod: 59

## 2022-04-15 LAB
ALBUMIN SERPL ELPH-MCNC: 4.6 G/DL
ALP BLD-CCNC: 123 U/L
ALT SERPL-CCNC: 13 U/L
ANION GAP SERPL CALC-SCNC: 16 MMOL/L
APPEARANCE: CLEAR
AST SERPL-CCNC: 22 U/L
BASOPHILS # BLD AUTO: 0.03 K/UL
BASOPHILS NFR BLD AUTO: 0.5 %
BILIRUB SERPL-MCNC: 1 MG/DL
BILIRUBIN URINE: NEGATIVE
BLOOD URINE: NORMAL
BUN SERPL-MCNC: 17 MG/DL
CALCIUM SERPL-MCNC: 9.5 MG/DL
CHLORIDE SERPL-SCNC: 101 MMOL/L
CHOLEST SERPL-MCNC: 192 MG/DL
CO2 SERPL-SCNC: 21 MMOL/L
COLOR: NORMAL
CREAT SERPL-MCNC: 0.77 MG/DL
EGFR: 85 ML/MIN/1.73M2
EOSINOPHIL # BLD AUTO: 0.13 K/UL
EOSINOPHIL NFR BLD AUTO: 2.1 %
ESTIMATED AVERAGE GLUCOSE: 117 MG/DL
FOLATE SERPL-MCNC: 18.1 NG/ML
GLUCOSE QUALITATIVE U: NEGATIVE
GLUCOSE SERPL-MCNC: 94 MG/DL
HBA1C MFR BLD HPLC: 5.7 %
HCT VFR BLD CALC: 40.3 %
HDLC SERPL-MCNC: 69 MG/DL
HGB BLD-MCNC: 13.7 G/DL
IMM GRANULOCYTES NFR BLD AUTO: 0.3 %
KETONES URINE: NEGATIVE
LDLC SERPL CALC-MCNC: 105 MG/DL
LEUKOCYTE ESTERASE URINE: NEGATIVE
LYMPHOCYTES # BLD AUTO: 1.65 K/UL
LYMPHOCYTES NFR BLD AUTO: 26.1 %
MAN DIFF?: NORMAL
MCHC RBC-ENTMCNC: 30.9 PG
MCHC RBC-ENTMCNC: 34 GM/DL
MCV RBC AUTO: 90.8 FL
MONOCYTES # BLD AUTO: 0.45 K/UL
MONOCYTES NFR BLD AUTO: 7.1 %
NEUTROPHILS # BLD AUTO: 4.04 K/UL
NEUTROPHILS NFR BLD AUTO: 63.9 %
NITRITE URINE: NEGATIVE
NONHDLC SERPL-MCNC: 123 MG/DL
PH URINE: 5.5
PLATELET # BLD AUTO: 201 K/UL
POTASSIUM SERPL-SCNC: 4.1 MMOL/L
PROT SERPL-MCNC: 7.6 G/DL
PROTEIN URINE: NEGATIVE
RBC # BLD: 4.44 M/UL
RBC # FLD: 13.1 %
SODIUM SERPL-SCNC: 138 MMOL/L
SPECIFIC GRAVITY URINE: 1.01
TRIGL SERPL-MCNC: 92 MG/DL
TSH SERPL-ACNC: 2.45 UIU/ML
UROBILINOGEN URINE: NORMAL
VIT B12 SERPL-MCNC: 447 PG/ML
WBC # FLD AUTO: 6.32 K/UL

## 2022-04-15 NOTE — COUNSELING
[Healthy eating counseling provided] : healthy eating [Behavioral health counseling provided] : behavioral health  [Sleep ___ hours/day] : Sleep [unfilled] hours/day [Engage in a relaxing activity] : Engage in a relaxing activity [de-identified] : Advised Mediterranean diet \par \par PRN Xanax helps her sleep "calm me down" \par \par She does a lot of walking as she does not drive.

## 2022-04-15 NOTE — REVIEW OF SYSTEMS
[Insomnia] : insomnia [Anxiety] : anxiety [Negative] : Gastrointestinal [Fever] : no fever [Chills] : no chills [Night Sweats] : no night sweats [Discharge] : no discharge [Vision Problems] : no vision problems [Itching] : no itching [Hearing Loss] : no hearing loss [Nasal Discharge] : no nasal discharge [Sore Throat] : no sore throat [Chest Pain] : no chest pain [Palpitations] : no palpitations [Headache] : no headache [Dizziness] : no dizziness [Confusion] : no confusion [FreeTextEntry9] : no complaints today  [de-identified] : hx of vertigo

## 2022-04-15 NOTE — HEALTH RISK ASSESSMENT
[Good] : ~his/her~ current health as good [Former] : Former [No] : In the past 12 months have you used drugs other than those required for medical reasons? No [No falls in past year] : Patient reported no falls in the past year [0] : 2) Feeling down, depressed, or hopeless: Not at all (0) [Fair] :  ~his/her~ mood as fair [1] : 1) Little interest or pleasure doing things for several days (1) [Patient reported mammogram was normal] : Patient reported mammogram was normal [Change in mental status noted] : Change in mental status noted [None] : None [Alone] : lives alone [Unemployed] : unemployed [High School] : high school [] :  [# Of Children ___] : has [unfilled] children [Feels Safe at Home] : Feels safe at home [Fully functional (bathing, dressing, toileting, transferring, walking, feeding)] : Fully functional (bathing, dressing, toileting, transferring, walking, feeding) [Fully functional (using the telephone, shopping, preparing meals, housekeeping, doing laundry, using] : Fully functional and needs no help or supervision to perform IADLs (using the telephone, shopping, preparing meals, housekeeping, doing laundry, using transportation, managing medications and managing finances) [Reports changes in dental health] : Reports changes in dental health [Smoke Detector] : smoke detector [Seat Belt] :  uses seat belt [FreeTextEntry1] : CPE [de-identified] : DENIES  [de-identified] : Oncology  [YearQuit] : 1980 [[id number]] : 0 [Reports changes in hearing] : Reports no changes in hearing [Reports changes in vision] : Reports no changes in vision [Guns at Home] : no guns at home [Travel to Developing Areas] : does not  travel to developing areas [TB Exposure] : is not being exposed to tuberculosis [MammogramDate] : 2018  [[place]Comments] [place] [facility] [place]onoscopyDate] : 2012  [ColonoscopyComments] : Dr. KIDD  2/26/19 appt.  [de-identified] : grieving loss of mother  [FreeTextEntry2] : HHA "sometimes" for [place]  [de-identified] : needs dentures

## 2022-04-15 NOTE — ASSESSMENT
[FreeTextEntry1] : Annual [facility]  exam for 66  year old  WF with PMH as stated in HPI / active list. \par \par Management : \par \par Refill provided after Lab  review.\par \par \par See HPI and Plan\par \par Labs in office today.   Will advise. \par \par Best wishes offered !\par \par

## 2022-04-15 NOTE — PHYSICAL EXAM
[No Acute Distress] : no acute distress [Normal Sclera/Conjunctiva] : normal sclera/conjunctiva [PERRL] : pupils equal round and reactive to light [Normal TMs] : both tympanic membranes were normal [Supple] : supple [Thyroid Normal, No Nodules] : the thyroid was normal and there were no nodules present [No Respiratory Distress] : no respiratory distress  [Clear to Auscultation] : lungs were clear to auscultation bilaterally [Normal Rate] : normal rate  [Regular Rhythm] : with a regular rhythm [Pedal Pulses Present] : the pedal pulses are present [No Edema] : there was no peripheral edema [Soft] : abdomen soft [Non Tender] : non-tender [No Masses] : no abdominal mass palpated [No CVA Tenderness] : no CVA  tenderness [No Spinal Tenderness] : no spinal tenderness [No Joint Swelling] : no joint swelling [No Rash] : no rash [Normal Gait] : normal gait [No Focal Deficits] : no focal deficits [Speech Grossly Normal] : speech grossly normal [Alert and Oriented x3] : oriented to person, place, and time [de-identified] : a bit tearful  however recovers  [de-identified] : OMM   NO TEETH  [de-identified] : warm and dry

## 2022-05-24 ENCOUNTER — APPOINTMENT (OUTPATIENT)
Dept: FAMILY MEDICINE | Facility: CLINIC | Age: 67
End: 2022-05-24
Payer: MEDICARE

## 2022-05-24 VITALS — DIASTOLIC BLOOD PRESSURE: 90 MMHG | SYSTOLIC BLOOD PRESSURE: 150 MMHG

## 2022-05-24 VITALS
HEIGHT: 60 IN | HEART RATE: 85 BPM | WEIGHT: 141 LBS | OXYGEN SATURATION: 98 % | BODY MASS INDEX: 27.68 KG/M2 | RESPIRATION RATE: 16 BRPM | TEMPERATURE: 97.4 F

## 2022-05-24 PROCEDURE — 99214 OFFICE O/P EST MOD 30 MIN: CPT

## 2022-05-27 ENCOUNTER — APPOINTMENT (OUTPATIENT)
Dept: FAMILY MEDICINE | Facility: CLINIC | Age: 67
End: 2022-05-27
Payer: MEDICARE

## 2022-05-27 VITALS
RESPIRATION RATE: 16 BRPM | WEIGHT: 140 LBS | TEMPERATURE: 97.8 F | HEART RATE: 71 BPM | BODY MASS INDEX: 27.48 KG/M2 | OXYGEN SATURATION: 98 % | HEIGHT: 60 IN

## 2022-05-27 DIAGNOSIS — K64.9 UNSPECIFIED HEMORRHOIDS: ICD-10-CM

## 2022-05-27 PROCEDURE — 99213 OFFICE O/P EST LOW 20 MIN: CPT

## 2022-05-27 NOTE — ASSESSMENT
[FreeTextEntry1] : SLIP and FALL recovering well  NO imaging needed.\par Advised ALARM device as she lives alone.\par \par CCS   advised appt. with Dr. Dasilva. for colonoscopy. \par \par BP  always elevated in [facility] office!

## 2022-05-27 NOTE — HISTORY OF PRESENT ILLNESS
[FreeTextEntry8] : the pt is here with left leg and bruising  to the back after falling 3 weeks ago \par \par Anu endorse she had a trip (over a [employer] box)  and fall outside her home 3 weeks ago incurring injury to LEFT LE and right side of rib cage as she fell on that side. \par \par Wants to know if she needs imaging.   Was not seen in ER or UC. \par \par MOSTLY better"  "Couldn't walk for first week: \par

## 2022-05-27 NOTE — PHYSICAL EXAM
[No Acute Distress] : no acute distress [Well-Appearing] : well-appearing [Normal Sclera/Conjunctiva] : normal sclera/conjunctiva [No JVD] : no jugular venous distention [No Respiratory Distress] : no respiratory distress  [Clear to Auscultation] : lungs were clear to auscultation bilaterally [Normal Rate] : normal rate  [Regular Rhythm] : with a regular rhythm [Alert and Oriented x3] : oriented to person, place, and time [de-identified] : calm and engaging  [de-identified] : JAMEEL   [de-identified] : able to take full deep breaths and no wincing   no step off  [de-identified] : no swelling or deformity of knees of LE  EXAM of ROM normal

## 2022-06-01 PROBLEM — K64.9 HEMORRHOIDS: Status: ACTIVE | Noted: 2022-06-01

## 2022-06-01 NOTE — HISTORY OF PRESENT ILLNESS
[FreeTextEntry8] : States that she has a bleeding hemorrhoid that was noticed after having a bowel movement on Wednesday.\par Seen and examined by GI this AM and confirmed hemorrhoid.\par No further bleeding.\par Reviewed with me strategies to avoid constipation. \par \par Wants me to check her BP. \par \par \par

## 2022-06-01 NOTE — PHYSICAL EXAM
[No Acute Distress] : no acute distress [Normal Sclera/Conjunctiva] : normal sclera/conjunctiva [No JVD] : no jugular venous distention [No Respiratory Distress] : no respiratory distress  [No Accessory Muscle Use] : no accessory muscle use [Normal Rate] : normal rate  [Regular Rhythm] : with a regular rhythm [Soft] : abdomen soft [Non Tender] : non-tender [Alert and Oriented x3] : oriented to person, place, and time [de-identified] : heightened a bit  [de-identified] : JAMEEL

## 2022-07-12 NOTE — PHYSICAL EXAM
[Normal Sclera/Conjunctiva] : normal sclera/conjunctiva [No JVD] : no jugular venous distention [No Respiratory Distress] : no respiratory distress  [Clear to Auscultation] : lungs were clear to auscultation bilaterally [Regular Rhythm] : with a regular rhythm [Normal S1, S2] : normal S1 and S2 [Soft] : abdomen soft [Non Tender] : non-tender [No Focal Deficits] : no focal deficits [Alert and Oriented x3] : oriented to person, place, and time [Normal Insight/Judgement] : insight and judgment were intact [de-identified] : JAMEEL  [de-identified] : candidaisis  under breasts   NO LESION NOTED patient given a mirror and could not find lesion today.  Home

## 2022-11-11 ENCOUNTER — APPOINTMENT (OUTPATIENT)
Dept: MAMMOGRAPHY | Facility: CLINIC | Age: 67
End: 2022-11-11

## 2022-11-11 ENCOUNTER — APPOINTMENT (OUTPATIENT)
Dept: ULTRASOUND IMAGING | Facility: CLINIC | Age: 67
End: 2022-11-11

## 2022-11-11 PROCEDURE — 77063 BREAST TOMOSYNTHESIS BI: CPT

## 2022-11-11 PROCEDURE — 77067 SCR MAMMO BI INCL CAD: CPT

## 2022-11-11 PROCEDURE — 76641 ULTRASOUND BREAST COMPLETE: CPT | Mod: 50

## 2022-11-18 ENCOUNTER — APPOINTMENT (OUTPATIENT)
Dept: FAMILY MEDICINE | Facility: CLINIC | Age: 67
End: 2022-11-18

## 2022-12-20 ENCOUNTER — APPOINTMENT (OUTPATIENT)
Dept: FAMILY MEDICINE | Facility: CLINIC | Age: 67
End: 2022-12-20

## 2023-01-20 ENCOUNTER — APPOINTMENT (OUTPATIENT)
Dept: FAMILY MEDICINE | Facility: CLINIC | Age: 68
End: 2023-01-20
Payer: MEDICARE

## 2023-01-20 VITALS
RESPIRATION RATE: 16 BRPM | WEIGHT: 145 LBS | HEIGHT: 60 IN | HEART RATE: 82 BPM | DIASTOLIC BLOOD PRESSURE: 98 MMHG | OXYGEN SATURATION: 99 % | BODY MASS INDEX: 28.47 KG/M2 | TEMPERATURE: 97.1 F | SYSTOLIC BLOOD PRESSURE: 158 MMHG
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
## 2023-01-20 PROCEDURE — 99214 OFFICE O/P EST MOD 30 MIN: CPT

## 2023-01-20 RX ORDER — ANASTROZOLE TABLETS 1 MG/1
1 TABLET ORAL
Qty: 90 | Refills: 3 | Status: COMPLETED | COMMUNITY
Start: 2019-02-19 | End: 2023-01-20

## 2023-01-20 NOTE — ASSESSMENT
[FreeTextEntry1] : HTN at goal  will continue amlodipine\par Agree with FU with cardiology;  info provided.\par \par HLD  LDL  105 TG  92  HDL 69  will continue atorvastatin\par \par FU annual exam in  APRIL.\par \par Mammo And US reviewed in HIE and unremarkable. \par \par \par Time Based Billing:   I have spent  35   minutes of time for this encounter.\par  Greater than 50 % of the face to face encounter time was spent on review of chart and consultations as well as  medication reconciliation,  counseling and/or coordination of care.                                                    .\par

## 2023-01-20 NOTE — HISTORY OF PRESENT ILLNESS
[FreeTextEntry1] : Anu is here for follow up \par Needs flu shot [de-identified] : Last seen in MAY 2022 for acute visit and encounter reviewed. Denies any concerns for bleeding hemorrhoids.\par \par Always anxious re : BP and any suggestion of cardiac problems.\par Compliant with medications for  HTN /HLD.\par \par Wants to go back to cardiologist  " it's been a while". \par \par No recent Falls. \par \par Annual Oncology consult in October and reviewed for stable Breast Cancer dx'ed 2016 and s/p lumpectomy RIGHT ER/[place] +; \par no neoadjuvant therapy. \par Anastrozole discontinues this year. \par M[facility]MO AND [facility] done [facility] and reviewed 2022 Unremarkable. \par

## 2023-01-20 NOTE — REVIEW OF SYSTEMS
[Joint Stiffness] : joint stiffness [Anxiety] : anxiety [Negative] : Gastrointestinal [Fever] : no fever [Night Sweats] : no night sweats [Discharge] : no discharge [Itching] : no itching [Chest Pain] : no chest pain [Palpitations] : no palpitations [Wheezing] : no wheezing [Dyspnea on Exertion] : no dyspnea on exertion [Joint Pain] : no joint pain [Depression] : no depression

## 2023-01-20 NOTE — PHYSICAL EXAM
[No Acute Distress] : no acute distress [Normal Sclera/Conjunctiva] : normal sclera/conjunctiva [No JVD] : no jugular venous distention [No Respiratory Distress] : no respiratory distress  [No Accessory Muscle Use] : no accessory muscle use [Normal Rate] : normal rate  [Regular Rhythm] : with a regular rhythm [No Edema] : there was no peripheral edema [No Focal Deficits] : no focal deficits [Alert and Oriented x3] : oriented to person, place, and time [Normal Insight/Judgement] : insight and judgment were intact [de-identified] : engaging  [de-identified] : JAMEEL

## 2023-04-14 ENCOUNTER — APPOINTMENT (OUTPATIENT)
Dept: FAMILY MEDICINE | Facility: CLINIC | Age: 68
End: 2023-04-14
Payer: MEDICARE

## 2023-04-14 ENCOUNTER — NON-APPOINTMENT (OUTPATIENT)
Age: 68
End: 2023-04-14

## 2023-04-14 VITALS
DIASTOLIC BLOOD PRESSURE: 88 MMHG | OXYGEN SATURATION: 98 % | SYSTOLIC BLOOD PRESSURE: 130 MMHG | RESPIRATION RATE: 16 BRPM | HEART RATE: 73 BPM | HEIGHT: 60 IN | TEMPERATURE: 98 F | BODY MASS INDEX: 28.27 KG/M2 | WEIGHT: 144 LBS

## 2023-04-14 VITALS — SYSTOLIC BLOOD PRESSURE: 130 MMHG | DIASTOLIC BLOOD PRESSURE: 74 MMHG

## 2023-04-14 DIAGNOSIS — K59.09 OTHER CONSTIPATION: ICD-10-CM

## 2023-04-14 DIAGNOSIS — Z00.00 ENCOUNTER FOR GENERAL ADULT MEDICAL EXAMINATION W/OUT ABNORMAL FINDINGS: ICD-10-CM

## 2023-04-14 DIAGNOSIS — Z12.11 ENCOUNTER FOR SCREENING FOR MALIGNANT NEOPLASM OF COLON: ICD-10-CM

## 2023-04-14 PROCEDURE — G0439: CPT

## 2023-04-14 PROCEDURE — 99214 OFFICE O/P EST MOD 30 MIN: CPT | Mod: 25

## 2023-04-14 PROCEDURE — 36415 COLL VENOUS BLD VENIPUNCTURE: CPT

## 2023-04-16 PROBLEM — Z12.11 COLON CANCER SCREENING: Status: ACTIVE | Noted: 2019-04-02

## 2023-04-16 NOTE — PHYSICAL EXAM
[No Acute Distress] : no acute distress [Normal Rate] : normal rate  [Regular Rhythm] : with a regular rhythm [de-identified] : Negative scratch test b/l,     heavy cerumen present b/l

## 2023-04-16 NOTE — HISTORY OF PRESENT ILLNESS
[FreeTextEntry1] : [facility]\par Last seen in January 2023 for FUV, last CPE April 2022, encounters reviewed. \par \par Recent consults reviewed and noted for:\par Surg Onc (s/p right breast lumpectomy 6/2016 f/u,      April 2023)\par Card (HTN, dyslipidemia, new mild murmur noted,      March 2023) \par \par TAMARA ARAMBULA is a 67 year old F who presents today for a [facility].  Pt has no complaints, has been well, and denies any recent ER visits/hospitalizations/MVA's or MSK injuries.  \par \par Continues to follow with Cardiology, states new is  "baby little murmur" discovered.  \par \par Intermittent neck and left hip pain.  States she is now sleeping on her couch, recently over purchased some things and needed her bed for storage.  \par \par Plans on scheduling colonoscopy with Dr. Dasilva.  FOR screening colonoscopy. endorses "I need someone to take me home after procedure.    " Not sure who it could be". \par \par Endy brought to office:\par Centrum\par Colace\par D3-1000\par Fish Oil \par \par Plans on following with dentist soon.  \par \par States she has not set up system for someone to be able to enter her home in case something happens to her, lives alone, has her bother and sister.  \par  [de-identified] : In review April 2022:\par [facility]  Last seen for same in 12/20;  reviewed. \par In recent year has been see in FU s/p near syncopal event in  her home, March 2021.\par \par SHE DID NOT GO TO cardiology as planned.  No further events. Declines for now. \par Follows with Oncology for PMH lumpectomy /radiation completed n 2016 ; tolerating Arimidex;   MRI  ( 11/21)  "all good and stable \par Osteopenia  Declines Prolia;  taking Calcium /Vitamin D \par \par Pt is here for   CPE \par Last   [facility]  was Dec. 2020\par In recent months has followed with Hematology/oncology for PMH ( 2016; RIGHT  DCIS)  of Breast Cancer .  ALL STABLE.  Takes weekly D 3. \par Otherwise has been well and compliant with medications.\par With regard to COVID, isolated and No suggestion of virus.\par Home renovations have been delayed. \par \par \par In review: 2019 \par Ms. TAMARA ARAMBULA presents today to establish care being referred to me by self. Known to me from [address] \par Her mother, Jacy Stinson, recently passed. \par \par She is an affable 63 year old female with PMH significant for cervical and L-S PAIN \par \par DCIS ( 2016) Follows with MD's in [place], Dr. Jimenez; Follows with Oncology \par \par PSH significant for  breast biopsy RIGHT \par \par Denies any recent ER visits/hospitalizations/ MVA's or MSK injuries. \par \par Social:  ; no children;  has siblings she is connected with \par               works intermittently for  [employer] as [employer] \par \par

## 2023-04-16 NOTE — COUNSELING
[FreeTextEntry2] : Maintain heart healthy diet: Choose a diet that is high in fruits, vegetables, whole grains and low in red and processed meats, sugars, and fats.  Choose poultry, lean meat and fish.  Choose unsaturated fats over saturated fats and limit salt intake.

## 2023-04-16 NOTE — PLAN
[FreeTextEntry1] : \par In review April 2022:\par Annual [facility]  exam for 66  year old  WF with PMH as stated in HPI / active list. \par \par Management : \par \par Refill provided after Lab  review.\par \par \par See HPI and Plan\par \par Labs in office today.   Will advise. \par \par Best wishes offered !\par \par

## 2023-04-16 NOTE — ASSESSMENT
[FreeTextEntry1] : \par [facility] for 67 year old WF with PMH as stated in HPI / active list. \par \par Management : \par \par See HPI and Plan.\par \par Labs in office today, will advise. Medications to be reconciled.\par \par Supplements discussed and reviewed with pt in office, understanding demonstrated.  \par ENT consult advised regarding cerumen removal, referral provided, follow up as needed. \par \par Colonoscopy order provided, patient to be contacted and GI referral provided for Dr. Dasilva. Pt. assisted to Office of Dr. Dasilva and appt made!  \par \par \par Best wishes offered! \par

## 2023-04-16 NOTE — ADDENDUM
[FreeTextEntry1] : Medical record entries made by the scribe today, were at my direction and personally dictated to them by me, Dr. Corin Ardon on 04/14/2023.  I have reviewed the chart and agree that the record accurately reflects my personal performance of the history, physical exam, assessment and plan.\par \par Ken ELIZONDO acting as scribe for Dr. Corin Ardon MD on 04/14/2023 at 3:03 PM.\par \par \par

## 2023-04-16 NOTE — HEALTH RISK ASSESSMENT
[PHQ-2 Negative - No further assessment needed] : PHQ-2 Negative - No further assessment needed [Never] : Never [Good] : ~his/her~ current health as good [Fair] :  ~his/her~ mood as fair [No] : In the past 12 months have you used drugs other than those required for medical reasons? No [No falls in past year] : Patient reported no falls in the past year [1] : 1) Little interest or pleasure doing things for several days (1) [0] : 2) Feeling down, depressed, or hopeless: Not at all (0) [Patient reported mammogram was normal] : Patient reported mammogram was normal [None] : None [Alone] : lives alone [Unemployed] : unemployed [High School] : high school [] :  [# Of Children ___] : has [unfilled] children [Feels Safe at Home] : Feels safe at home [Fully functional (bathing, dressing, toileting, transferring, walking, feeding)] : Fully functional (bathing, dressing, toileting, transferring, walking, feeding) [Fully functional (using the telephone, shopping, preparing meals, housekeeping, doing laundry, using] : Fully functional and needs no help or supervision to perform IADLs (using the telephone, shopping, preparing meals, housekeeping, doing laundry, using transportation, managing medications and managing finances) [Reports changes in dental health] : Reports changes in dental health [Smoke Detector] : smoke detector [Carbon Monoxide Detector] : carbon monoxide detector [Seat Belt] :  uses seat belt [Former] : Former [> 15 Years] : > 15 Years [FreeTextEntry1] : CPE [de-identified] : DENIES  [de-identified] : Oncology  [[id number]] : 0 [Change in mental status noted] : No change in mental status noted [Reports changes in hearing] : Reports no changes in hearing [Reports changes in vision] : Reports no changes in vision [Guns at Home] : no guns at home [Travel to Developing Areas] : does not  travel to developing areas [TB Exposure] : is not being exposed to tuberculosis [MammogramDate] : 9/2021 [MammogramComments] : Bi rads 2 [ColonoscopyComments] : Dr. KIDD  2/26/19 appt.  [FreeTextEntry2] : HHA "sometimes" for [place]  [de-identified] : needs dentures

## 2023-04-16 NOTE — HISTORY OF PRESENT ILLNESS
[FreeTextEntry1] : [facility]\par Last seen in January 2023 for FUV, last CPE April 2022, encounters reviewed. \par \par Recent consults reviewed and noted for:\par Surg Onc (s/p right breast lumpectomy 6/2016 f/u,      April 2023)\par Card (HTN, dyslipidemia, new mild murmur noted,      March 2023) \par \par TAMARA ARAMBULA is a 67 year old F who presents today for a MCAWV.  Pt has no complaints, has been well, and denies any recent ER visits/hospitalizations/MVA's or MSK injuries.  \par \par Continues to follow with Cardiology, states new is  "baby little murmur" discovered.  \par \par Intermittent neck and left hip pain.  States she is now sleeping on her couch, recently over purchased some things and needed her bed for storage.  \par \par Plans on scheduling colonoscopy with Dr. Dasilva.  FOR screening colonoscopy. endorses "I need someone to take me home after procedure.    " Not sure who it could be". \par \par Endy brought to office:\par Centrum\par Colace\par D3-1000\par Fish Oil \par \par Plans on following with dentist soon.  \par \par States she has not set up system for someone to be able to enter her home in case something happens to her, lives alone, has her bother and sister.  \par  [de-identified] : In review April 2022:\par [facility]  Last seen for same in 12/20;  reviewed. \par In recent year has been see in FU s/p near syncopal event in  her home, March 2021.\par \par SHE DID NOT GO TO cardiology as planned.  No further events. Declines for now. \par Follows with Oncology for PMH lumpectomy /radiation completed n 2016 ; tolerating Arimidex;   MRI  ( 11/21)  "all good and stable \par Osteopenia  Declines Prolia;  taking Calcium /Vitamin D \par \par Pt is here for   CPE \par Last   [facility]  was Dec. 2020\par In recent months has followed with Hematology/oncology for PMH ( 2016; RIGHT  DCIS)  of Breast Cancer .  ALL STABLE.  Takes weekly D 3. \par Otherwise has been well and compliant with medications.\par With regard to COVID, isolated and No suggestion of virus.\par Home renovations have been delayed. \par \par \par In review: 2019 \par Ms. TAMARA ARAMBULA presents today to establish care being referred to me by self. Known to me from [address] \par Her mother, Jacy Stinson, recently passed. \par \par She is an affable 63 year old female with PMH significant for cervical and L-S PAIN \par \par DCIS ( 2016) Follows with MD's in [place], Dr. Jimenez; Follows with Oncology \par \par PSH significant for  breast biopsy RIGHT \par \par Denies any recent ER visits/hospitalizations/ MVA's or MSK injuries. \par \par Social:  ; no children;  has siblings she is connected with \par               works intermittently for  [employer] as [employer] \par \par

## 2023-04-16 NOTE — HEALTH RISK ASSESSMENT
[PHQ-2 Negative - No further assessment needed] : PHQ-2 Negative - No further assessment needed [Never] : Never [Good] : ~his/her~ current health as good [Fair] :  ~his/her~ mood as fair [No] : In the past 12 months have you used drugs other than those required for medical reasons? No [No falls in past year] : Patient reported no falls in the past year [1] : 1) Little interest or pleasure doing things for several days (1) [0] : 2) Feeling down, depressed, or hopeless: Not at all (0) [Patient reported mammogram was normal] : Patient reported mammogram was normal [None] : None [Alone] : lives alone [Unemployed] : unemployed [High School] : high school [] :  [# Of Children ___] : has [unfilled] children [Feels Safe at Home] : Feels safe at home [Fully functional (bathing, dressing, toileting, transferring, walking, feeding)] : Fully functional (bathing, dressing, toileting, transferring, walking, feeding) [Fully functional (using the telephone, shopping, preparing meals, housekeeping, doing laundry, using] : Fully functional and needs no help or supervision to perform IADLs (using the telephone, shopping, preparing meals, housekeeping, doing laundry, using transportation, managing medications and managing finances) [Reports changes in dental health] : Reports changes in dental health [Smoke Detector] : smoke detector [Carbon Monoxide Detector] : carbon monoxide detector [Seat Belt] :  uses seat belt [Former] : Former [> 15 Years] : > 15 Years [FreeTextEntry1] : CPE [de-identified] : DENIES  [de-identified] : Oncology  [[id number]] : 0 [Change in mental status noted] : No change in mental status noted [Reports changes in hearing] : Reports no changes in hearing [Reports changes in vision] : Reports no changes in vision [Guns at Home] : no guns at home [Travel to Developing Areas] : does not  travel to developing areas [TB Exposure] : is not being exposed to tuberculosis [MammogramDate] : 9/2021 [MammogramComments] : Bi rads 2 [ColonoscopyComments] : Dr. KIDD  2/26/19 appt.  [FreeTextEntry2] : HHA "sometimes" for [place]  [de-identified] : needs dentures

## 2023-04-16 NOTE — PHYSICAL EXAM
[No Acute Distress] : no acute distress [Normal Rate] : normal rate  [Regular Rhythm] : with a regular rhythm [de-identified] : Negative scratch test b/l,     heavy cerumen present b/l

## 2023-04-25 LAB
ALBUMIN SERPL ELPH-MCNC: 4.6 G/DL
ALP BLD-CCNC: 126 U/L
ALT SERPL-CCNC: 13 U/L
ANION GAP SERPL CALC-SCNC: 12 MMOL/L
AST SERPL-CCNC: 19 U/L
BASOPHILS # BLD AUTO: 0.03 K/UL
BASOPHILS NFR BLD AUTO: 0.4 %
BILIRUB SERPL-MCNC: 1 MG/DL
BUN SERPL-MCNC: 13 MG/DL
CALCIUM SERPL-MCNC: 9.7 MG/DL
CHLORIDE SERPL-SCNC: 104 MMOL/L
CHOLEST SERPL-MCNC: 188 MG/DL
CO2 SERPL-SCNC: 26 MMOL/L
CREAT SERPL-MCNC: 0.77 MG/DL
EGFR: 84 ML/MIN/1.73M2
EOSINOPHIL # BLD AUTO: 0.14 K/UL
EOSINOPHIL NFR BLD AUTO: 2.1 %
ESTIMATED AVERAGE GLUCOSE: 117 MG/DL
GLUCOSE SERPL-MCNC: 76 MG/DL
HBA1C MFR BLD HPLC: 5.7 %
HCT VFR BLD CALC: 40.7 %
HDLC SERPL-MCNC: 65 MG/DL
HGB BLD-MCNC: 13.6 G/DL
IMM GRANULOCYTES NFR BLD AUTO: 0.4 %
LDLC SERPL CALC-MCNC: 100 MG/DL
LYMPHOCYTES # BLD AUTO: 1.77 K/UL
LYMPHOCYTES NFR BLD AUTO: 26 %
MAN DIFF?: NORMAL
MCHC RBC-ENTMCNC: 30.4 PG
MCHC RBC-ENTMCNC: 33.4 GM/DL
MCV RBC AUTO: 91.1 FL
MONOCYTES # BLD AUTO: 0.6 K/UL
MONOCYTES NFR BLD AUTO: 8.8 %
NEUTROPHILS # BLD AUTO: 4.25 K/UL
NEUTROPHILS NFR BLD AUTO: 62.3 %
NONHDLC SERPL-MCNC: 124 MG/DL
PLATELET # BLD AUTO: 204 K/UL
POTASSIUM SERPL-SCNC: 4.1 MMOL/L
PROT SERPL-MCNC: 7 G/DL
RBC # BLD: 4.47 M/UL
RBC # FLD: 13 %
SODIUM SERPL-SCNC: 142 MMOL/L
TRIGL SERPL-MCNC: 120 MG/DL
TSH SERPL-ACNC: 2.53 UIU/ML
WBC # FLD AUTO: 6.82 K/UL

## 2023-05-01 ENCOUNTER — NON-APPOINTMENT (OUTPATIENT)
Age: 68
End: 2023-05-01

## 2023-05-07 LAB
APPEARANCE: CLEAR
BILIRUBIN URINE: NEGATIVE
BLOOD URINE: NEGATIVE
COLOR: YELLOW
GLUCOSE QUALITATIVE U: NEGATIVE MG/DL
KETONES URINE: NEGATIVE MG/DL
LEUKOCYTE ESTERASE URINE: NEGATIVE
NITRITE URINE: NEGATIVE
PH URINE: 6
PROTEIN URINE: NEGATIVE MG/DL
SPECIFIC GRAVITY URINE: 1.01
UROBILINOGEN URINE: 0.2 MG/DL

## 2023-12-12 ENCOUNTER — APPOINTMENT (OUTPATIENT)
Dept: FAMILY MEDICINE | Facility: CLINIC | Age: 68
End: 2023-12-12
Payer: MEDICARE

## 2023-12-12 VITALS
WEIGHT: 146 LBS | TEMPERATURE: 98.3 F | RESPIRATION RATE: 16 BRPM | HEIGHT: 60 IN | OXYGEN SATURATION: 99 % | BODY MASS INDEX: 28.66 KG/M2 | HEART RATE: 79 BPM

## 2023-12-12 DIAGNOSIS — E78.5 HYPERLIPIDEMIA, UNSPECIFIED: ICD-10-CM

## 2023-12-12 DIAGNOSIS — D05.10 INTRADUCTAL CARCINOMA IN SITU OF UNSPECIFIED BREAST: ICD-10-CM

## 2023-12-12 PROCEDURE — 36415 COLL VENOUS BLD VENIPUNCTURE: CPT

## 2023-12-12 PROCEDURE — G0008: CPT

## 2023-12-12 PROCEDURE — 99214 OFFICE O/P EST MOD 30 MIN: CPT | Mod: 25

## 2023-12-12 PROCEDURE — 90686 IIV4 VACC NO PRSV 0.5 ML IM: CPT

## 2023-12-12 RX ORDER — ALPRAZOLAM 0.5 MG/1
0.5 TABLET ORAL
Qty: 60 | Refills: 0 | Status: DISCONTINUED | COMMUNITY
Start: 2019-02-19 | End: 2023-12-12

## 2023-12-16 NOTE — HISTORY OF PRESENT ILLNESS
[FreeTextEntry1] : Anu is here for follow up \par  Needs flu shot [de-identified] : Last seen in April for [facility] and encounter reviewed. Since  denies any recent ER visits/hospitalizations/ MVA's or MSK injuries  or FALLS. Endorses compliance with medications.   Always anxious re : BP and any suggestion of cardiac problems. Compliant with medications for  HTN /HLD.  DID FU with cardiologist in [place] and reviewed for   stability.  Annual Oncology consult in October and reviewed for stable Breast Cancer dx'ed 2016 and s/p lumpectomy RIGHT ER/[place] +;  no neoadjuvant therapy.  Anastrozole discontinues this year.  M[facility]MO AND [facility] done with [facility] and reviewed 2022 Unremarkable.

## 2023-12-16 NOTE — REVIEW OF SYSTEMS
[Fever] : no fever [Night Sweats] : no night sweats [Discharge] : no discharge [Itching] : no itching [Chest Pain] : no chest pain [Palpitations] : no palpitations [Wheezing] : no wheezing [Dyspnea on Exertion] : no dyspnea on exertion [Joint Pain] : no joint pain [Joint Stiffness] : joint stiffness [Anxiety] : anxiety [Depression] : no depression [Negative] : Gastrointestinal

## 2023-12-16 NOTE — PHYSICAL EXAM
[No Acute Distress] : no acute distress [Normal Sclera/Conjunctiva] : normal sclera/conjunctiva [No JVD] : no jugular venous distention [No Respiratory Distress] : no respiratory distress  [No Accessory Muscle Use] : no accessory muscle use [Regular Rhythm] : with a regular rhythm [Normal Rate] : normal rate  [No Edema] : there was no peripheral edema [No Focal Deficits] : no focal deficits [Alert and Oriented x3] : oriented to person, place, and time [Normal Insight/Judgement] : insight and judgment were intact [de-identified] : engaging  [de-identified] : JAMEEL

## 2023-12-21 LAB
ALBUMIN SERPL ELPH-MCNC: 4.6 G/DL
ALP BLD-CCNC: 121 U/L
ALT SERPL-CCNC: 12 U/L
ANION GAP SERPL CALC-SCNC: 12 MMOL/L
AST SERPL-CCNC: 19 U/L
BASOPHILS # BLD AUTO: 0.03 K/UL
BASOPHILS NFR BLD AUTO: 0.5 %
BILIRUB SERPL-MCNC: 0.9 MG/DL
BUN SERPL-MCNC: 15 MG/DL
CALCIUM SERPL-MCNC: 9.4 MG/DL
CHLORIDE SERPL-SCNC: 102 MMOL/L
CHOLEST SERPL-MCNC: 167 MG/DL
CO2 SERPL-SCNC: 25 MMOL/L
CREAT SERPL-MCNC: 0.73 MG/DL
EGFR: 90 ML/MIN/1.73M2
EOSINOPHIL # BLD AUTO: 0.09 K/UL
EOSINOPHIL NFR BLD AUTO: 1.4 %
ESTIMATED AVERAGE GLUCOSE: 114 MG/DL
GLUCOSE SERPL-MCNC: 83 MG/DL
HBA1C MFR BLD HPLC: 5.6 %
HCT VFR BLD CALC: 42.6 %
HDLC SERPL-MCNC: 62 MG/DL
HGB BLD-MCNC: 13.9 G/DL
IMM GRANULOCYTES NFR BLD AUTO: 0.3 %
LDLC SERPL CALC-MCNC: 86 MG/DL
LYMPHOCYTES # BLD AUTO: 1.58 K/UL
LYMPHOCYTES NFR BLD AUTO: 24.9 %
MAN DIFF?: NORMAL
MCHC RBC-ENTMCNC: 30.3 PG
MCHC RBC-ENTMCNC: 32.6 GM/DL
MCV RBC AUTO: 93 FL
MONOCYTES # BLD AUTO: 0.57 K/UL
MONOCYTES NFR BLD AUTO: 9 %
NEUTROPHILS # BLD AUTO: 4.06 K/UL
NEUTROPHILS NFR BLD AUTO: 63.9 %
NONHDLC SERPL-MCNC: 105 MG/DL
PLATELET # BLD AUTO: 197 K/UL
POTASSIUM SERPL-SCNC: 4.3 MMOL/L
PROT SERPL-MCNC: 7.2 G/DL
RBC # BLD: 4.58 M/UL
RBC # FLD: 13.2 %
SODIUM SERPL-SCNC: 140 MMOL/L
TRIGL SERPL-MCNC: 104 MG/DL
WBC # FLD AUTO: 6.35 K/UL

## 2024-01-24 ENCOUNTER — RESULT REVIEW (OUTPATIENT)
Age: 69
End: 2024-01-24

## 2024-01-24 ENCOUNTER — APPOINTMENT (OUTPATIENT)
Dept: MAMMOGRAPHY | Facility: CLINIC | Age: 69
End: 2024-01-24

## 2024-01-24 ENCOUNTER — APPOINTMENT (OUTPATIENT)
Dept: MRI IMAGING | Facility: CLINIC | Age: 69
End: 2024-01-24
Payer: MEDICARE

## 2024-01-24 PROCEDURE — 77067 SCR MAMMO BI INCL CAD: CPT

## 2024-01-24 PROCEDURE — 77049 MRI BREAST C-+ W/CAD BI: CPT

## 2024-01-24 PROCEDURE — 77063 BREAST TOMOSYNTHESIS BI: CPT

## 2024-01-24 PROCEDURE — A9585: CPT

## 2024-02-27 ENCOUNTER — APPOINTMENT (OUTPATIENT)
Dept: OPHTHALMOLOGY | Facility: CLINIC | Age: 69
End: 2024-02-27
Payer: MEDICARE

## 2024-02-27 ENCOUNTER — NON-APPOINTMENT (OUTPATIENT)
Age: 69
End: 2024-02-27

## 2024-02-27 PROCEDURE — 92133 CPTRZD OPH DX IMG PST SGM ON: CPT

## 2024-02-27 PROCEDURE — 99204 OFFICE O/P NEW MOD 45 MIN: CPT

## 2024-03-26 ENCOUNTER — NON-APPOINTMENT (OUTPATIENT)
Age: 69
End: 2024-03-26

## 2024-03-26 ENCOUNTER — APPOINTMENT (OUTPATIENT)
Dept: OPHTHALMOLOGY | Facility: CLINIC | Age: 69
End: 2024-03-26
Payer: MEDICARE

## 2024-03-26 PROCEDURE — 99213 OFFICE O/P EST LOW 20 MIN: CPT

## 2024-03-26 PROCEDURE — 76514 ECHO EXAM OF EYE THICKNESS: CPT

## 2024-04-10 ENCOUNTER — APPOINTMENT (OUTPATIENT)
Dept: INTERNAL MEDICINE | Facility: CLINIC | Age: 69
End: 2024-04-10
Payer: MEDICARE

## 2024-04-10 VITALS — WEIGHT: 145 LBS | BODY MASS INDEX: 28.47 KG/M2 | HEIGHT: 60 IN | HEART RATE: 74 BPM | OXYGEN SATURATION: 98 %

## 2024-04-10 VITALS — SYSTOLIC BLOOD PRESSURE: 142 MMHG | DIASTOLIC BLOOD PRESSURE: 82 MMHG

## 2024-04-10 DIAGNOSIS — F41.9 ANXIETY DISORDER, UNSPECIFIED: ICD-10-CM

## 2024-04-10 DIAGNOSIS — M47.812 SPONDYLOSIS W/OUT MYELOPATHY OR RADICULOPATHY, CERVICAL REGION: ICD-10-CM

## 2024-04-10 DIAGNOSIS — I10 ESSENTIAL (PRIMARY) HYPERTENSION: ICD-10-CM

## 2024-04-10 DIAGNOSIS — M43.10 SPONDYLOLISTHESIS, SITE UNSPECIFIED: ICD-10-CM

## 2024-04-10 PROCEDURE — 99213 OFFICE O/P EST LOW 20 MIN: CPT

## 2024-04-10 NOTE — PLAN
[FreeTextEntry1] : [employer] bus application filled out, scanned into chart, and handed to patient to submit.  Anxiety: - Patient reports weekly anxiety that is troublesome and can interfere with daily life. - Rx escitalopram 5 mg sent to patient's pharmacy. Side effects including nausea and weight gain reviewed with patient. Patient agreeable to start medication.  Recommend f/u with cardiology and GI. Patient to make these appts tomorrow.  f/u 1 month to evaluate effectiveness of escitalopram

## 2024-04-10 NOTE — HISTORY OF PRESENT ILLNESS
[FreeTextEntry8] : Patient is a 68-year-old female presenting for forms needing to be filled out. Patient would like [employer] bus forms filled out due to inability to walk 3 blocks to bus stop, as her closer bus stop was eliminated. She has trouble standing for long periods of time due to severe degenerative disc disease in the lumbar spine. Patient also reports anxiety with panic attacks once a week. Anxiety episodes a few times a week that interfere with daily tasks. She also has physical symptoms of nervousness, chest heaviness, only associated with anxiety. She is supposed to be seeing a cardiologist for evaluation and gastroenterologist for colonoscopy. She has not made these appts yet.

## 2024-04-12 ENCOUNTER — APPOINTMENT (OUTPATIENT)
Dept: FAMILY MEDICINE | Facility: CLINIC | Age: 69
End: 2024-04-12

## 2024-05-08 ENCOUNTER — RX RENEWAL (OUTPATIENT)
Age: 69
End: 2024-05-08

## 2024-05-08 RX ORDER — ESCITALOPRAM OXALATE 5 MG/1
5 TABLET ORAL DAILY
Qty: 30 | Refills: 0 | Status: ACTIVE | COMMUNITY
Start: 2024-04-10 | End: 1900-01-01

## 2024-05-10 ENCOUNTER — APPOINTMENT (OUTPATIENT)
Dept: INTERNAL MEDICINE | Facility: CLINIC | Age: 69
End: 2024-05-10

## 2024-06-20 ENCOUNTER — RX RENEWAL (OUTPATIENT)
Age: 69
End: 2024-06-20

## 2024-06-20 RX ORDER — AMLODIPINE BESYLATE 5 MG/1
5 TABLET ORAL
Qty: 60 | Refills: 1 | Status: ACTIVE | COMMUNITY
Start: 2019-10-22 | End: 1900-01-01

## 2024-06-25 ENCOUNTER — APPOINTMENT (OUTPATIENT)
Dept: OPHTHALMOLOGY | Facility: CLINIC | Age: 69
End: 2024-06-25

## 2024-06-26 ENCOUNTER — APPOINTMENT (OUTPATIENT)
Dept: INTERNAL MEDICINE | Facility: CLINIC | Age: 69
End: 2024-06-26

## 2024-07-16 ENCOUNTER — APPOINTMENT (OUTPATIENT)
Dept: INTERNAL MEDICINE | Facility: CLINIC | Age: 69
End: 2024-07-16
Payer: MEDICARE

## 2024-07-16 VITALS
OXYGEN SATURATION: 98 % | BODY MASS INDEX: 28.47 KG/M2 | SYSTOLIC BLOOD PRESSURE: 136 MMHG | WEIGHT: 145 LBS | HEART RATE: 68 BPM | DIASTOLIC BLOOD PRESSURE: 86 MMHG | HEIGHT: 60 IN

## 2024-07-16 DIAGNOSIS — I10 ESSENTIAL (PRIMARY) HYPERTENSION: ICD-10-CM

## 2024-07-16 DIAGNOSIS — R32 UNSPECIFIED URINARY INCONTINENCE: ICD-10-CM

## 2024-07-16 DIAGNOSIS — E78.5 HYPERLIPIDEMIA, UNSPECIFIED: ICD-10-CM

## 2024-07-16 DIAGNOSIS — Z13.820 ENCOUNTER FOR SCREENING FOR OSTEOPOROSIS: ICD-10-CM

## 2024-07-16 PROCEDURE — G2211 COMPLEX E/M VISIT ADD ON: CPT

## 2024-07-16 PROCEDURE — 99213 OFFICE O/P EST LOW 20 MIN: CPT

## 2024-07-16 PROCEDURE — 99203 OFFICE O/P NEW LOW 30 MIN: CPT

## 2024-07-16 RX ORDER — GARLIC 400 MG
400 TABLET, DELAYED RELEASE (ENTERIC COATED) ORAL DAILY
Refills: 0 | Status: ACTIVE | COMMUNITY
Start: 2024-07-16

## 2024-07-26 ENCOUNTER — APPOINTMENT (OUTPATIENT)
Dept: FAMILY MEDICINE | Facility: CLINIC | Age: 69
End: 2024-07-26

## 2024-07-26 PROCEDURE — 36415 COLL VENOUS BLD VENIPUNCTURE: CPT

## 2024-07-27 LAB
25(OH)D3 SERPL-MCNC: 36.8 NG/ML
ALBUMIN SERPL ELPH-MCNC: 4.7 G/DL
ALP BLD-CCNC: 122 U/L
ALT SERPL-CCNC: 11 U/L
ANION GAP SERPL CALC-SCNC: 13 MMOL/L
APPEARANCE: CLEAR
AST SERPL-CCNC: 20 U/L
BACTERIA: NEGATIVE /HPF
BASOPHILS # BLD AUTO: 0.01 K/UL
BASOPHILS NFR BLD AUTO: 0.2 %
BILIRUB SERPL-MCNC: 1 MG/DL
BILIRUBIN URINE: NEGATIVE
BLOOD URINE: NEGATIVE
BUN SERPL-MCNC: 17 MG/DL
CALCIUM SERPL-MCNC: 9.7 MG/DL
CHLORIDE SERPL-SCNC: 104 MMOL/L
CHOLEST SERPL-MCNC: 179 MG/DL
CO2 SERPL-SCNC: 23 MMOL/L
COLOR: YELLOW
CREAT SERPL-MCNC: 0.88 MG/DL
EGFR: 72 ML/MIN/1.73M2
EOSINOPHIL # BLD AUTO: 0.09 K/UL
EOSINOPHIL NFR BLD AUTO: 1.4 %
ESTIMATED AVERAGE GLUCOSE: 114 MG/DL
FERRITIN SERPL-MCNC: 133 NG/ML
FOLATE SERPL-MCNC: 14.5 NG/ML
GLUCOSE QUALITATIVE U: NEGATIVE MG/DL
GLUCOSE SERPL-MCNC: 93 MG/DL
HBA1C MFR BLD HPLC: 5.6 %
HCT VFR BLD CALC: 39.8 %
HDLC SERPL-MCNC: 65 MG/DL
HGB BLD-MCNC: 12.9 G/DL
IMM GRANULOCYTES NFR BLD AUTO: 0.2 %
KETONES URINE: NEGATIVE MG/DL
LDLC SERPL CALC-MCNC: 99 MG/DL
LEUKOCYTE ESTERASE URINE: NEGATIVE
LYMPHOCYTES # BLD AUTO: 1.48 K/UL
LYMPHOCYTES NFR BLD AUTO: 23.5 %
MAN DIFF?: NORMAL
MCHC RBC-ENTMCNC: 31.2 PG
MCHC RBC-ENTMCNC: 32.4 GM/DL
MCV RBC AUTO: 96.1 FL
MICROSCOPIC-UA: NORMAL
MONOCYTES # BLD AUTO: 0.4 K/UL
MONOCYTES NFR BLD AUTO: 6.3 %
NEUTROPHILS # BLD AUTO: 4.31 K/UL
NEUTROPHILS NFR BLD AUTO: 68.4 %
NITRITE URINE: NEGATIVE
NONHDLC SERPL-MCNC: 114 MG/DL
PH URINE: 5.5
PLATELET # BLD AUTO: 179 K/UL
POTASSIUM SERPL-SCNC: 4.1 MMOL/L
PROT SERPL-MCNC: 7.2 G/DL
PROTEIN URINE: NEGATIVE MG/DL
RBC # BLD: 4.14 M/UL
RBC # FLD: 13.8 %
RED BLOOD CELLS URINE: 1 /HPF
SODIUM SERPL-SCNC: 140 MMOL/L
SPECIFIC GRAVITY URINE: 1.02
TRIGL SERPL-MCNC: 85 MG/DL
TSH SERPL-ACNC: 2.85 UIU/ML
UROBILINOGEN URINE: 0.2 MG/DL
VIT B12 SERPL-MCNC: 343 PG/ML
WBC # FLD AUTO: 6.3 K/UL
WHITE BLOOD CELLS URINE: 2 /HPF

## 2024-07-30 ENCOUNTER — APPOINTMENT (OUTPATIENT)
Dept: INTERNAL MEDICINE | Facility: CLINIC | Age: 69
End: 2024-07-30
Payer: MEDICARE

## 2024-07-30 ENCOUNTER — NON-APPOINTMENT (OUTPATIENT)
Age: 69
End: 2024-07-30

## 2024-07-30 VITALS
OXYGEN SATURATION: 97 % | WEIGHT: 147 LBS | HEIGHT: 60 IN | HEART RATE: 80 BPM | DIASTOLIC BLOOD PRESSURE: 82 MMHG | SYSTOLIC BLOOD PRESSURE: 140 MMHG | BODY MASS INDEX: 28.86 KG/M2

## 2024-07-30 DIAGNOSIS — Z12.83 ENCOUNTER FOR SCREENING FOR MALIGNANT NEOPLASM OF SKIN: ICD-10-CM

## 2024-07-30 DIAGNOSIS — Z13.820 ENCOUNTER FOR SCREENING FOR OSTEOPOROSIS: ICD-10-CM

## 2024-07-30 DIAGNOSIS — Z83.49 FAMILY HISTORY OF OTHER ENDOCRINE, NUTRITIONAL AND METABOLIC DISEASES: ICD-10-CM

## 2024-07-30 DIAGNOSIS — Z12.11 ENCOUNTER FOR SCREENING FOR MALIGNANT NEOPLASM OF COLON: ICD-10-CM

## 2024-07-30 DIAGNOSIS — F41.9 ANXIETY DISORDER, UNSPECIFIED: ICD-10-CM

## 2024-07-30 DIAGNOSIS — E78.5 HYPERLIPIDEMIA, UNSPECIFIED: ICD-10-CM

## 2024-07-30 DIAGNOSIS — I10 ESSENTIAL (PRIMARY) HYPERTENSION: ICD-10-CM

## 2024-07-30 PROCEDURE — G0439: CPT

## 2024-07-30 PROCEDURE — 93000 ELECTROCARDIOGRAM COMPLETE: CPT

## 2024-07-30 PROCEDURE — G2211 COMPLEX E/M VISIT ADD ON: CPT | Mod: NC

## 2024-07-30 NOTE — HEALTH RISK ASSESSMENT
[Good] : ~his/her~  mood as  good [No] : In the past 12 months have you used drugs other than those required for medical reasons? No [No falls in past year] : Patient reported no falls in the past year [0] : 2) Feeling down, depressed, or hopeless: Not at all (0) [Former] : Former [0-4] : 0-4 [> 15 Years] : > 15 Years [NO] : No [No Retinopathy] : No retinopathy [Patient reported mammogram was normal] : Patient reported mammogram was normal [Patient reported colonoscopy was normal] : Patient reported colonoscopy was normal [HIV test declined] : HIV test declined [Hepatitis C test declined] : Hepatitis C test declined [Alone] : lives alone [Retired] : retired [] :  [Feels Safe at Home] : Feels safe at home [Fully functional (bathing, dressing, toileting, transferring, walking, feeding)] : Fully functional (bathing, dressing, toileting, transferring, walking, feeding) [Fully functional (using the telephone, shopping, preparing meals, housekeeping, doing laundry, using] : Fully functional and needs no help or supervision to perform IADLs (using the telephone, shopping, preparing meals, housekeeping, doing laundry, using transportation, managing medications and managing finances) [Reports normal functional visual acuity (ie: able to read med bottle)] : Reports normal functional visual acuity [Smoke Detector] : smoke detector [Carbon Monoxide Detector] : carbon monoxide detector [Safety elements used in home] : safety elements used in home [Seat Belt] :  uses seat belt [Sunscreen] : uses sunscreen [de-identified] : none [de-identified] : see HPI [[id number]] : 0 [de-identified] : 0.1 PPD x 4 years = pack years, quit 30 years ago [EyeExamDate] : 05/2024 [Sexually Active] : not sexually active [High Risk Behavior] : no high risk behavior [Reports changes in hearing] : Reports no changes in hearing [Reports changes in vision] : Reports no changes in vision [Reports changes in dental health] : Reports no changes in dental health [Travel to Developing Areas] : does not  travel to developing areas [TB Exposure] : is not being exposed to tuberculosis [Caregiver Concerns] : does not have caregiver concerns [MammogramDate] : 01/2024 [MammogramComments] : Benign [PapSmearDate] : aged out [BoneDensityDate] : never [ColonoscopyDate] : 07/2013 [ColonoscopyComments] : f/u 5 years

## 2024-07-30 NOTE — PLAN
[FreeTextEntry1] : HTN: - BP slightly elevated today - Recommend reducing salt intake as patient's diet is high in sodium - Patient tolerating amlodipine well with no side effects - If BP elevated on next visit, will increase amlodipine or add second antihypertensive - Cardiology referral placed for re-establishment of care given age, HTN, HLD, and strong family hx CAD  HLD, overweight - Patient's diet high in fat - Advised patient to limit dietary sources of cholesterol/saturated fats including red meats, processed meats, cheese, butter, and fried foods. Diet should consist of lean meats such as grilled chicken and fish, vegetables, and only a small amount of complex carbohydrates. - Patient should exercise regularly with a goal of 150 min/week of moderately vigorous exercise.  - Patient tolerating statin well with no reported side effects  Anxiety: - Patient has not started lexapro yet - Recommend starting lexapro to reduce anxiety levels - If patient decides she does not want to try the SSRI, will recommend talk therapy  GERD: - Continue low dose omeprazole - Recommend GERD diet including avoidance of alcohol, spicy foods, citrus foods, peppermint, carbonated beverages and caffeine. Recommend patient avoid laying down for 2-3 hours after eating, as this may exacerbate GERD symptoms.  Urinary leakage: - Still present, but improving - Will await final urine culture and sensitivities - Can consider pelvic floor therapy if symptoms are not due to UTI  HCM: - Labs recently drawn were all normal. No need for labs today - EKG SB with no ST segment abnormalities - Next mammo- 01/2025. Recommend patient follow up with for routine visit with breast surgeon, as she states she's overdue for visit - Next pap- ageed out. Recommend patient establish care with GYN for health maintenance. GYN referral placed - DEXA script provided to patient at last visit. Strongly recommend patient schedule DEXA exam - Strongly urged patient to make appt with gastroenterologist for colonoscopy for colon cancer screening, as she is 8 years overdue. Patient states she will call and make an appt. - Next eye exam- patient due for appt, as she was advised to follow up in June. Patient to call to make appt - Dermatology referral placed for skin cancer screening - Patient advised to make appt with dentist for dental exam - Discussed availability of shingles vaccine for herpes zoster. Discussed risks associated with herpes zoster including postherpetic neuralgia, herpes ophthalmicus, and visceral dissemination of herpes zoster, which is a life-threatening emergency. Advised patient to get vaccinated at local pharmacy to avoid these complications. - Patient eligible for [id number] in 2026  f/u 6 months

## 2024-07-30 NOTE — PLAN
[FreeTextEntry1] : HTN: - BP slightly elevated today - Recommend reducing salt intake as patient's diet is high in sodium - Patient tolerating amlodipine well with no side effects - If BP elevated on next visit, will increase amlodipine or add second antihypertensive - Cardiology referral placed for re-establishment of care given age, HTN, HLD, and strong family hx CAD  HLD, overweight - Patient's diet high in fat - Advised patient to limit dietary sources of cholesterol/saturated fats including red meats, processed meats, cheese, butter, and fried foods. Diet should consist of lean meats such as grilled chicken and fish, vegetables, and only a small amount of complex carbohydrates. - Patient should exercise regularly with a goal of 150 min/week of moderately vigorous exercise.  - Patient tolerating statin well with no reported side effects  Anxiety: - Patient has not started lexapro yet - Recommend starting lexapro to reduce anxiety levels - If patient decides she does not want to try the SSRI, will recommend talk therapy  GERD: - Continue low dose omeprazole - Recommend GERD diet including avoidance of alcohol, spicy foods, citrus foods, peppermint, carbonated beverages and caffeine. Recommend patient avoid laying down for 2-3 hours after eating, as this may exacerbate GERD symptoms.  Urinary leakage: - Still present, but improving - Will await final urine culture and sensitivities - Can consider pelvic floor therapy if symptoms are not due to UTI  HCM: - Labs recently drawn were all normal. No need for labs today - EKG SB with no ST segment abnormalities - Next mammo- 01/2025. Recommend patient follow up with for routine visit with breast surgeon, as she states she's overdue for visit - Next pap- ageed out. Recommend patient establish care with GYN for health maintenance. GYN referral placed - DEXA script provided to patient at last visit. Strongly recommend patient schedule DEXA exam - Strongly urged patient to make appt with gastroenterologist for colonoscopy for colon cancer screening, as she is 8 years overdue. Patient states she will call and make an appt. - Next eye exam- patient due for appt, as she was advised to follow up in June. Patient to call to make appt - Dermatology referral placed for skin cancer screening - Patient advised to make appt with dentist for dental exam - Discussed availability of shingles vaccine for herpes zoster. Discussed risks associated with herpes zoster including postherpetic neuralgia, herpes ophthalmicus, and visceral dissemination of herpes zoster, which is a life-threatening emergency. Advised patient to get vaccinated at local pharmacy to avoid these complications. - Patient eligible for [id number] in 2026  f/u 6 months

## 2024-07-30 NOTE — HEALTH RISK ASSESSMENT
[Good] : ~his/her~  mood as  good [No] : In the past 12 months have you used drugs other than those required for medical reasons? No [No falls in past year] : Patient reported no falls in the past year [0] : 2) Feeling down, depressed, or hopeless: Not at all (0) [Former] : Former [0-4] : 0-4 [> 15 Years] : > 15 Years [NO] : No [No Retinopathy] : No retinopathy [Patient reported mammogram was normal] : Patient reported mammogram was normal [Patient reported colonoscopy was normal] : Patient reported colonoscopy was normal [HIV test declined] : HIV test declined [Hepatitis C test declined] : Hepatitis C test declined [Alone] : lives alone [Retired] : retired [] :  [Feels Safe at Home] : Feels safe at home [Fully functional (bathing, dressing, toileting, transferring, walking, feeding)] : Fully functional (bathing, dressing, toileting, transferring, walking, feeding) [Fully functional (using the telephone, shopping, preparing meals, housekeeping, doing laundry, using] : Fully functional and needs no help or supervision to perform IADLs (using the telephone, shopping, preparing meals, housekeeping, doing laundry, using transportation, managing medications and managing finances) [Reports normal functional visual acuity (ie: able to read med bottle)] : Reports normal functional visual acuity [Smoke Detector] : smoke detector [Carbon Monoxide Detector] : carbon monoxide detector [Safety elements used in home] : safety elements used in home [Seat Belt] :  uses seat belt [Sunscreen] : uses sunscreen [de-identified] : none [de-identified] : see HPI [TNQ6Yafaq] : 0 [de-identified] : 0.1 PPD x 4 years = pack years, quit 30 years ago [EyeExamDate] : 05/2024 [Sexually Active] : not sexually active [High Risk Behavior] : no high risk behavior [Reports changes in hearing] : Reports no changes in hearing [Reports changes in vision] : Reports no changes in vision [Reports changes in dental health] : Reports no changes in dental health [Travel to Developing Areas] : does not  travel to developing areas [TB Exposure] : is not being exposed to tuberculosis [Caregiver Concerns] : does not have caregiver concerns [MammogramDate] : 01/2024 [MammogramComments] : Benign [PapSmearDate] : aged out [BoneDensityDate] : never [ColonoscopyDate] : 07/2013 [ColonoscopyComments] : f/u 5 years

## 2024-07-30 NOTE — HISTORY OF PRESENT ILLNESS
[FreeTextEntry1] : COLEEN [de-identified] : Patient is a 68-year-old female with PMH HTN, anxiety, HLD, and OA presenting for an AWV. Patient reports she eats a poor diet, high in carbs and saturated fat. She states bread is her downfall. Patient is a hx breast cancer diagnosed in 2016 s/p lumpectomy right ER/MS+ no neoadjuvant therapy necessary. No longer on anastrazole. Patient follows with Dr. Wynne at [facility] for breast cancer. Patient reports urinary incontinence has improved, only mild leakage now. Patient has not started lexapro for anxiety. Patient reports she has not seen a cardiologist in many years  Last mammo- 01/2024 (BR2), last MRI 1/24 (normal) Last DEXA- never Last pap smear- stopped getting pap smears, does not see GYN Last colonoscopy- 07/2013 (f/u in 5 years, patient did not follow-up) Dr. Dasilva Last dental exam- many years ago Last eye exam- 4 months ago (normal) Last skin cancer screening- never [id number]- PCV and PPSV, last dose 2021 Shingles vaccine- never

## 2024-07-30 NOTE — HISTORY OF PRESENT ILLNESS
[FreeTextEntry1] : COLEEN [de-identified] : Patient is a 68-year-old female with PMH HTN, anxiety, HLD, and OA presenting for an AWV. Patient reports she eats a poor diet, high in carbs and saturated fat. She states bread is her downfall. Patient is a hx breast cancer diagnosed in 2016 s/p lumpectomy right ER/[place]+ no neoadjuvant therapy necessary. No longer on anastrazole. Patient follows with Dr. Wynne at [facility] for breast cancer. Patient reports urinary incontinence has improved, only mild leakage now. Patient has not started lexapro for anxiety. Patient reports she has not seen a cardiologist in many years  Last mammo- 01/2024 (BR2), last MRI 1/24 (normal) Last DEXA- never Last pap smear- stopped getting pap smears, does not see GYN Last colonoscopy- 07/2013 (f/u in 5 years, patient did not follow-up) Dr. Dasilva Last dental exam- many years ago Last eye exam- 4 months ago (normal) Last skin cancer screening- never [id number]- PCV and PPSV, last dose 2021 Shingles vaccine- never

## 2024-07-31 DIAGNOSIS — N39.0 URINARY TRACT INFECTION, SITE NOT SPECIFIED: ICD-10-CM

## 2024-07-31 RX ORDER — NITROFURANTOIN (MONOHYDRATE/MACROCRYSTALS) 25; 75 MG/1; MG/1
100 CAPSULE ORAL
Qty: 10 | Refills: 0 | Status: ACTIVE | COMMUNITY
Start: 2024-07-31 | End: 1900-01-01

## 2024-08-08 ENCOUNTER — RX RENEWAL (OUTPATIENT)
Age: 69
End: 2024-08-08

## 2024-10-11 ENCOUNTER — RX RENEWAL (OUTPATIENT)
Age: 69
End: 2024-10-11

## 2025-01-06 ENCOUNTER — APPOINTMENT (OUTPATIENT)
Dept: INTERNAL MEDICINE | Facility: CLINIC | Age: 70
End: 2025-01-06

## 2025-02-06 ENCOUNTER — RX RENEWAL (OUTPATIENT)
Age: 70
End: 2025-02-06

## 2025-05-07 ENCOUNTER — APPOINTMENT (OUTPATIENT)
Dept: INTERNAL MEDICINE | Facility: CLINIC | Age: 70
End: 2025-05-07
Payer: MEDICARE

## 2025-05-07 VITALS
WEIGHT: 147 LBS | DIASTOLIC BLOOD PRESSURE: 80 MMHG | HEART RATE: 82 BPM | SYSTOLIC BLOOD PRESSURE: 148 MMHG | HEIGHT: 60 IN | OXYGEN SATURATION: 98 % | BODY MASS INDEX: 28.86 KG/M2

## 2025-05-07 DIAGNOSIS — R55 SYNCOPE AND COLLAPSE: ICD-10-CM

## 2025-05-07 DIAGNOSIS — Z12.11 ENCOUNTER FOR SCREENING FOR MALIGNANT NEOPLASM OF COLON: ICD-10-CM

## 2025-05-07 DIAGNOSIS — R35.0 FREQUENCY OF MICTURITION: ICD-10-CM

## 2025-05-07 DIAGNOSIS — E78.5 HYPERLIPIDEMIA, UNSPECIFIED: ICD-10-CM

## 2025-05-07 DIAGNOSIS — R51.9 HEADACHE, UNSPECIFIED: ICD-10-CM

## 2025-05-07 DIAGNOSIS — F41.9 ANXIETY DISORDER, UNSPECIFIED: ICD-10-CM

## 2025-05-07 DIAGNOSIS — I10 ESSENTIAL (PRIMARY) HYPERTENSION: ICD-10-CM

## 2025-05-07 LAB
BILIRUB UR QL STRIP: NEGATIVE
GLUCOSE UR-MCNC: NEGATIVE
HCG UR QL: 0.2 EU/DL
HGB UR QL STRIP.AUTO: ABNORMAL
KETONES UR-MCNC: NEGATIVE
LEUKOCYTE ESTERASE UR QL STRIP: ABNORMAL
NITRITE UR QL STRIP: NEGATIVE
PH UR STRIP: 5.5
PROT UR STRIP-MCNC: NEGATIVE
SP GR UR STRIP: 1.01

## 2025-05-07 PROCEDURE — 99214 OFFICE O/P EST MOD 30 MIN: CPT

## 2025-05-07 PROCEDURE — G2211 COMPLEX E/M VISIT ADD ON: CPT

## 2025-05-07 PROCEDURE — 81003 URINALYSIS AUTO W/O SCOPE: CPT | Mod: QW

## 2025-05-11 LAB — BACTERIA UR CULT: NORMAL

## 2025-06-05 ENCOUNTER — APPOINTMENT (OUTPATIENT)
Dept: FAMILY MEDICINE | Facility: CLINIC | Age: 70
End: 2025-06-05

## 2025-06-06 ENCOUNTER — APPOINTMENT (OUTPATIENT)
Dept: INTERNAL MEDICINE | Facility: CLINIC | Age: 70
End: 2025-06-06

## 2025-06-16 ENCOUNTER — RX RENEWAL (OUTPATIENT)
Age: 70
End: 2025-06-16

## 2025-08-21 ENCOUNTER — RX RENEWAL (OUTPATIENT)
Age: 70
End: 2025-08-21